# Patient Record
Sex: MALE | Race: WHITE | NOT HISPANIC OR LATINO | URBAN - METROPOLITAN AREA
[De-identification: names, ages, dates, MRNs, and addresses within clinical notes are randomized per-mention and may not be internally consistent; named-entity substitution may affect disease eponyms.]

---

## 2021-09-01 ENCOUNTER — TELEPHONE (OUTPATIENT)
Dept: NEUROSURGERY | Facility: CLINIC | Age: 56
End: 2021-09-01

## 2021-09-01 NOTE — TELEPHONE ENCOUNTER
Caller: BRITTANY GAMEZ    Relationship: Emergency Contact    Best call back number: [phone]    What was the call regarding: PATIENT'S WIFE CALLED TO ASK IF WE TAKE HER HUSBANDS INSURANCE.   SHE IS GOING TO HAVE HER 'S PCP SEND HIM IN A REFERRAL.

## 2021-10-28 ENCOUNTER — OFFICE VISIT (OUTPATIENT)
Dept: NEUROSURGERY | Facility: CLINIC | Age: 56
End: 2021-10-28

## 2021-10-28 ENCOUNTER — HOSPITAL ENCOUNTER (OUTPATIENT)
Dept: GENERAL RADIOLOGY | Facility: HOSPITAL | Age: 56
Discharge: HOME | End: 2021-10-28
Admitting: PHYSICIAN ASSISTANT

## 2021-10-28 VITALS
HEIGHT: 70 IN | OXYGEN SATURATION: 98 % | TEMPERATURE: 97.3 F | HEART RATE: 100 BPM | DIASTOLIC BLOOD PRESSURE: 90 MMHG | WEIGHT: 235 LBS | BODY MASS INDEX: 33.64 KG/M2 | SYSTOLIC BLOOD PRESSURE: 130 MMHG

## 2021-10-28 DIAGNOSIS — M54.12 CERVICAL RADICULOPATHY: Primary | ICD-10-CM

## 2021-10-28 DIAGNOSIS — M54.12 CERVICAL RADICULOPATHY: ICD-10-CM

## 2021-10-28 PROCEDURE — 99204 OFFICE O/P NEW MOD 45 MIN: CPT | Performed by: PHYSICIAN ASSISTANT

## 2021-10-28 PROCEDURE — 72040 X-RAY EXAM NECK SPINE 2-3 VW: CPT

## 2021-10-28 RX ORDER — LISINOPRIL 20 MG/1
20 TABLET ORAL DAILY
COMMUNITY
Start: 2021-08-30

## 2021-10-28 RX ORDER — GABAPENTIN 300 MG/1
300 CAPSULE ORAL 2 TIMES DAILY
COMMUNITY
Start: 2021-09-30 | End: 2022-10-18 | Stop reason: SDUPTHER

## 2021-10-28 NOTE — PROGRESS NOTES
Patient: Jos Miller  : 1965    Primary Care Provider: Kain Gordon MD      Chief Complaint: Neck and right arm pain    History of Present Illness:       Patient is a nice 56-year-old gentleman who is evaluated in neurosurgical practice for neck and right arm pain.  Patient is not naïve to neurosurgery and has had a C5-C7 anterior cervical discectomy and fusion done in [place] when he lived there.  This procedure is done 3 years ago.  Patient did relatively well had intermittent flareups that went away with time but about 4 months ago noticed he had a really bad crick in his neck that started radiating down to the right trapezius into the right deltoid and on down in his arm was more numbness.  Patient is really having a lot of trouble with tipping his head backward.    Patient has given this quite a bit of time but not yet done physical therapy.  Patient is not done injections but has been on gabapentin that is helping about 20%.    This is giving him fits daily and he wishes to try to be treated conservatively which is what we will try.     Patient is here with an MRI from [facility] which does not have any axial imaging without contrast and does not have a T2 axial and some of the images are quite grainy.  This I would consider this is substandard image and I have explained this to the patient.    From my view I think there is a right-sided disc herniation at C4-5 but this would not attribute to all of his symptoms.    Review of Systems   Constitutional: Positive for activity change. Negative for appetite change, chills, diaphoresis, fatigue, fever and unexpected weight change.   HENT: Negative for congestion, dental problem, drooling, ear discharge, ear pain, facial swelling, hearing loss, mouth sores, nosebleeds, postnasal drip, rhinorrhea, sinus pressure, sinus pain, sneezing, sore throat, tinnitus, trouble swallowing and voice change.    Eyes: Negative for photophobia, pain, discharge,  redness, itching and visual disturbance.   Respiratory: Negative for apnea, cough, choking, chest tightness, shortness of breath, wheezing and stridor.    Cardiovascular: Negative for chest pain, palpitations and leg swelling.   Gastrointestinal: Negative for abdominal distention, abdominal pain, anal bleeding, blood in stool, constipation, diarrhea, nausea, rectal pain and vomiting.   Endocrine: Negative for cold intolerance, heat intolerance, polydipsia, polyphagia and polyuria.   Genitourinary: Negative for decreased urine volume, difficulty urinating, dysuria, enuresis, flank pain, frequency, genital sores, hematuria and urgency.   Musculoskeletal: Positive for myalgias, neck pain and neck stiffness. Negative for arthralgias, back pain, gait problem and joint swelling.   Skin: Negative for color change, pallor, rash and wound.   Allergic/Immunologic: Negative for environmental allergies, food allergies and immunocompromised state.   Neurological: Positive for weakness, numbness and headaches. Negative for dizziness, tremors, seizures, syncope, facial asymmetry, speech difficulty and light-headedness.   Hematological: Negative for adenopathy. Does not bruise/bleed easily.   Psychiatric/Behavioral: Negative for agitation, behavioral problems, confusion, decreased concentration, dysphoric mood, hallucinations, self-injury, sleep disturbance and suicidal ideas. The patient is not nervous/anxious and is not hyperactive.    All other systems reviewed and are negative.      Past Medical History:     Past Medical History:   Diagnosis Date   • Back problem    • Headache    • HL (hearing loss)     Right ear only   • Hypertension        Family History:     Family History   Problem Relation Age of Onset   • Other Mother         Polio, whooping cough   • Arthritis Mother    • Atrial fibrillation Mother    • Asthma Mother    • Alzheimer's disease Father    • Liver cancer Maternal Grandmother        Social History:    reports  "that she quit smoking about 13 years ago. She has a 12.00 pack-year smoking history. She has never used smokeless tobacco. She reports previous alcohol use. She reports that she does not use drugs.   SMOKING STATUS: Non-smoker    Surgical History:     Past Surgical History:   Procedure Laterality Date   • APPENDECTOMY  1980   • AXILLARY LYMPH NODE BIOPSY/EXCISION  1997   • CERVICAL LYMPH NODE BIOPSY/EXCISION  1997   • COCHLEAR IMPLANT REVISION  2017   • NECK SURGERY  2017    Dr. Nikhil Barker.        Allergies:   Patient has no known allergies.    Physical Exam:    Vital Signs:/90 (BP Location: Right arm, Patient Position: Sitting, Cuff Size: Adult)   Pulse 100   Temp 97.3 °F (36.3 °C)   Ht 177.8 cm (70\")   Wt 107 kg (235 lb)   SpO2 98%   BMI 33.72 kg/m²    BMI: Body mass index is 33.72 kg/m².     GENERAL:           The patient is in no acute distress, and is able to answer all questions appropriately.    Neck:          Supple without lymphadenopathy    Cardiovascular:       Peripheral pulses 2+ at dorsalis pedis and posterior tibialis    Lungs:         Breathing unlabored    Musculoskeletal:            strength is 5 out of 5 bilaterally.        Shoulder abduction is 5 out of 5.         Dorsiflexion is 5/5 Bilaterally       Plantarflexion is 5/5 bilaterally       Hip Flexion 5/5 bilaterally.         The patient´s gait is normal without antalgia.    Neurologic:          The patient is alert and oriented by 3.          Pupils are equal and reactive to light.         Visual fields are full.         Extraocular movements are intact without nystagmus.         There is no evidence of central motor drift. No facial droop.  No difficulty with rapid alternating movements.         Sensation is equal bilaterally with no deficit.           Reflexes:  2+ through out    CRANIAL NERVES:         Cranial nerve II: Visual fields are full to confrontation.       Cranial nerves III, IV and VI: PERRLA DC.  Extraocular " movements are intact.  Nystagmus is not present.       Cranial nerve V: Facial sensation is intact to light touch.       Cranial nerve VII: Muscles of facial expression revealed no asymmetry.       Cranial nerve VIII: Hearing is intact to finger rub bilaterally.       Cranial nerve IX and X: Palate elevates symmetrically.        Cranial nerve XI: Shoulder shrug is intact.       Cranial nerve XII: Tongue is midline without evidence of Atrophy or fasciculation.    Medical Decision Making    Data Review:   I reviewed the MRI and shows a stable fusion at C5-C7.  No egregious spinal stenosis at the levels above or below.  Patient does seem to have a rightward disc herniation at C4-5 on the axial postcontrast T1 image.  But I cannot delineate this on any of the sagittal imaging    Diagnosis:   Cervical radiculopathy  Status post C5-C7 ACDF in [place] 3 years ago    Treatment Options:   With him having a lot of pain with tipping his neck back, to get a flexion-extension x-ray to ensure that there is no abnormal motion.  His spinous processes at C4-5 seem to be quite splayed I will to make sure there is no posterior ligamentous disruption.    As far as the MRI goes it is not the greatest quality but I do think I see a rightward C4-5 disc herniation on the axial imaging that would attribute to some of his pain in his trapezius and his deltoid.  We will get a foraminal injection with Dr. Jones to see if this gives him some relief.    If he fails to improve with pain management a myelogram and EMG were discussed at this visit and might be his best route considering he has prior hardware in the cervical MRI was less than revealing.    Patient's Body mass index is 33.72 kg/m². indicating that she is obese (BMI >30). Obesity-related health conditions include the following: none. Obesity is newly identified. BMI is is above average; BMI management plan is completed. We discussed portion control and increasing exercise.     Diagnosis  Plan   1. Cervical radiculopathy  XR spine cervical flex and ext only    Ambulatory Referral to Pain Management

## 2021-11-17 ENCOUNTER — TELEPHONE (OUTPATIENT)
Dept: NEUROSURGERY | Facility: CLINIC | Age: 56
End: 2021-11-17

## 2021-11-17 NOTE — TELEPHONE ENCOUNTER
Spoke with pt, very nice gentleman. He had his first consult with Dr. Jones this past Monday, his injection is not scheduled until December 1st.     I advised pt that we should r/s his appt on 11/22/21 until AFTER his injection to see if it helps, he is in full agreement. However, I have changed his appt to a telephone visit to go over XR results. (If Chalo is in agreement)   His # is [phone]

## 2021-11-22 ENCOUNTER — OFFICE VISIT (OUTPATIENT)
Dept: NEUROSURGERY | Facility: CLINIC | Age: 56
End: 2021-11-22

## 2021-11-22 VITALS — HEIGHT: 70 IN | BODY MASS INDEX: 33.64 KG/M2 | WEIGHT: 235 LBS

## 2021-11-22 DIAGNOSIS — M54.12 CERVICAL RADICULOPATHY: Primary | ICD-10-CM

## 2021-11-22 PROCEDURE — 99442 PR PHYS/QHP TELEPHONE EVALUATION 11-20 MIN: CPT | Performed by: PHYSICIAN ASSISTANT

## 2021-12-16 ENCOUNTER — TELEPHONE (OUTPATIENT)
Dept: NEUROSURGERY | Facility: CLINIC | Age: 56
End: 2021-12-16

## 2021-12-16 NOTE — TELEPHONE ENCOUNTER
Provider:  Chalo CORADO  Caller: Hoda  Time of call:   10:38  Phone #:  [phone]  Surgery:    Surgery Date:    Last visit:   11/22/21  Next visit:     NAA:         Reason for call:     Please see Natalie's note.    Okay to schedule follow-up?

## 2021-12-16 NOTE — TELEPHONE ENCOUNTER
Caller: BRITTANY GAMEZ    Relationship to patient: Emergency Contact    Best call back number: [phone] (PATIENT'S #)    Chief complaint: CERVICAL RADICULOPATHY    Type of visit: F/U (OK WITH TELEHEALTH)    Requested date: ASAP     If rescheduling, when is the original appointment: N/A     Additional notes: PATIENT'S SPOUSE IS CALLING TO RELAY THE MESSAGE THAT EPIDURAL INJECTIONS ARE NOT WORKING FOR HIM. SHE WOULD LIKE TO SCHEDULE A FOLLOW UP ASAP; PLEASE ADVISE ON SCHEDULING AND/OR OTHER CARE OPTIONS. THANK YOU.

## 2022-01-04 ENCOUNTER — OFFICE VISIT (OUTPATIENT)
Dept: NEUROSURGERY | Facility: CLINIC | Age: 57
End: 2022-01-04

## 2022-01-04 VITALS — WEIGHT: 235 LBS | BODY MASS INDEX: 33.64 KG/M2 | HEIGHT: 70 IN

## 2022-01-04 DIAGNOSIS — M54.12 CERVICAL RADICULOPATHY: Primary | ICD-10-CM

## 2022-01-04 PROCEDURE — 99442 PR PHYS/QHP TELEPHONE EVALUATION 11-20 MIN: CPT | Performed by: PHYSICIAN ASSISTANT

## 2022-01-04 NOTE — H&P (VIEW-ONLY)
You have chosen to receive care through a telephone visit. Do you consent to use a telephone visit for your medical care today? Yes    15 minutes    Patient: Jos Miller  : 1965    Primary Care Provider: Kain Gordon MD      Chief Complaint: Posterior neck pain    History of Present Illness:        Patient is a nice 56-year-old gentleman who is evaluated in neurosurgical practice for neck and right arm pain.  Patient is not naïve to neurosurgery and has had a C5-C7 anterior cervical discectomy and fusion done in [place] when he lived there.  This procedure is done 3 years ago.  Patient did relatively well had intermittent flareups that went away with time but about 4 months ago noticed he had a really bad crick in his neck that started radiating down to the right trapezius into the right deltoid and on down in his arm was more numbness.  Patient is really having a lot of trouble with tipping his head backward.    At this point patient's numbness after starting the gabapentin has improved with his right upper extremity but he still having a lot of crepitus in his neck when he leans his head forward or backward which causes incredible stiffness and pain that is in between the upper portions of his scapulas.    As discussed in last visit his MRI did not come with axial T2 and T1 axial is very grainy but I do think that there could be a adjacent level disc on the right more so than left at C4-5. This is difficult to delineate with his MRI and I think that it may be best to pursue myelography has discussed with the patient.    Patient really failed with his injections to get much relief out of his neck and his neck stop would be a pain pump. I will get a myelogram with Dr. Monge to check for good fusion status at C5-7 as well as to check the adjacent level at C4-5 for any abnormal motion as well as foraminal stenosis. He really lacks any symptoms into his arm at this point after starting the gabapentin  but does have pain that radiates from the base of his neck into the posterior portion of his trapezius.     Review of Systems   Constitutional: Negative for activity change, appetite change, chills, diaphoresis, fatigue, fever and unexpected weight change.   HENT: Negative for congestion, dental problem, drooling, ear discharge, ear pain, facial swelling, hearing loss, mouth sores, nosebleeds, postnasal drip, rhinorrhea, sinus pressure, sneezing, sore throat, tinnitus, trouble swallowing and voice change.    Eyes: Negative for photophobia, pain, discharge, redness, itching and visual disturbance.   Respiratory: Negative for apnea, cough, choking, chest tightness, shortness of breath, wheezing and stridor.    Cardiovascular: Negative for chest pain, palpitations and leg swelling.   Gastrointestinal: Negative for abdominal distention, abdominal pain, anal bleeding, blood in stool, constipation, diarrhea, nausea, rectal pain and vomiting.   Endocrine: Negative for cold intolerance, heat intolerance, polydipsia, polyphagia and polyuria.   Genitourinary: Negative for decreased urine volume, difficulty urinating, dysuria, enuresis, flank pain, frequency, genital sores, hematuria and urgency.   Musculoskeletal: Positive for myalgias and neck pain. Negative for arthralgias, back pain, gait problem, joint swelling and neck stiffness.   Skin: Negative for color change, pallor, rash and wound.   Allergic/Immunologic: Negative for environmental allergies, food allergies and immunocompromised state.   Neurological: Negative for dizziness, tremors, seizures, syncope, facial asymmetry, speech difficulty, weakness, light-headedness, numbness and headaches.   Hematological: Negative for adenopathy. Does not bruise/bleed easily.   Psychiatric/Behavioral: Negative for agitation, behavioral problems, confusion, decreased concentration, dysphoric mood, hallucinations, self-injury, sleep disturbance and suicidal ideas. The patient is not  "nervous/anxious and is not hyperactive.    All other systems reviewed and are negative.      Past Medical History:     Past Medical History:   Diagnosis Date   • Back problem    • Headache    • HL (hearing loss)     Right ear only   • Hypertension        Family History:     Family History   Problem Relation Age of Onset   • Other Mother         Polio, whooping cough   • Arthritis Mother    • Atrial fibrillation Mother    • Asthma Mother    • Alzheimer's disease Father    • Liver cancer Maternal Grandmother        Social History:    reports that she quit smoking about 14 years ago. She has a 12.00 pack-year smoking history. She has never used smokeless tobacco. She reports previous alcohol use. She reports that she does not use drugs.   SMOKING STATUS: Non-smoker    Surgical History:     Past Surgical History:   Procedure Laterality Date   • APPENDECTOMY  1980   • AXILLARY LYMPH NODE BIOPSY/EXCISION  1997   • CERVICAL LYMPH NODE BIOPSY/EXCISION  1997   • COCHLEAR IMPLANT REVISION  2017   • NECK SURGERY  2017    Dr. Nikhil Barker.        Allergies:   Patient has no known allergies.    Physical Exam:    Vital Signs:Ht 177.8 cm (70\")   Wt 107 kg (235 lb)   BMI 33.72 kg/m²    BMI: Body mass index is 33.72 kg/m².     Exam limited secondary to telephone encounter    Medical Decision Making    Data Review:   No new films reviewed at this visit. Once again MRI was reviewed and axial images that were available were very grainy but it does look like he has an adjacent level disc that is difficult to tell with the image quality.    Diagnosis:   Cervical radiculopathy  Status post C5-C7 ACDF  Possible adjacent level disease    Treatment Options:   Patient has fairly convincing symptoms of a C4-5 adjacent level issue however his MRI that he supplied me with his of poor quality does not have a T2 axial and fails to show me my suspicion. This also Pue with a post myelogram CT which will show us the fusion of the CT 5 to C7 to " make sure there is no pseudoarthrosis. Flexion-extension should also reveal any issues with hardware.    I suspect that he has adjacent level issues which would be easily delineated via myelogram. Risk benefits and expected outcomes were discussed with patient and he understands that Dr. Monge will be doing this in the near future. We will talk with him after the myelogram is done to see if there is anything further surgical intervention that we can contemplate.    Patient's Body mass index is 33.72 kg/m². indicating that she is morbidly obese (BMI > 40 or > 35 with obesity - related health condition). Obesity-related health conditions include the following: none. Obesity is unchanged. BMI is is above average; BMI management plan is completed. We discussed portion control and increasing exercise.    No diagnosis found.

## 2022-01-05 ENCOUNTER — PREP FOR SURGERY (OUTPATIENT)
Dept: OTHER | Facility: HOSPITAL | Age: 57
End: 2022-01-05

## 2022-01-05 DIAGNOSIS — M54.12 CERVICAL RADICULOPATHY: Primary | ICD-10-CM

## 2022-01-19 ENCOUNTER — HOSPITAL ENCOUNTER (OUTPATIENT)
Facility: HOSPITAL | Age: 57
Setting detail: HOSPITAL OUTPATIENT SURGERY
Discharge: HOME | End: 2022-01-19
Attending: RADIOLOGY | Admitting: RADIOLOGY

## 2022-01-19 ENCOUNTER — APPOINTMENT (OUTPATIENT)
Dept: CT IMAGING | Facility: HOSPITAL | Age: 57
End: 2022-01-19

## 2022-01-19 VITALS
BODY MASS INDEX: 34.59 KG/M2 | RESPIRATION RATE: 18 BRPM | HEART RATE: 104 BPM | TEMPERATURE: 97.8 F | HEIGHT: 70 IN | DIASTOLIC BLOOD PRESSURE: 95 MMHG | WEIGHT: 241.62 LBS | SYSTOLIC BLOOD PRESSURE: 140 MMHG | OXYGEN SATURATION: 97 %

## 2022-01-19 DIAGNOSIS — M54.12 CERVICAL RADICULOPATHY: ICD-10-CM

## 2022-01-19 PROCEDURE — 25010000002 IOPAMIDOL 61 % SOLUTION: Performed by: RADIOLOGY

## 2022-01-19 PROCEDURE — 72240 MYELOGRAPHY NECK SPINE: CPT | Performed by: RADIOLOGY

## 2022-01-19 PROCEDURE — 72126 CT NECK SPINE W/DYE: CPT

## 2022-01-19 PROCEDURE — 61055 INJECTION INTO BRAIN CANAL: CPT | Performed by: RADIOLOGY

## 2022-01-19 RX ORDER — LIDOCAINE HYDROCHLORIDE 10 MG/ML
INJECTION, SOLUTION EPIDURAL; INFILTRATION; INTRACAUDAL; PERINEURAL AS NEEDED
Status: DISCONTINUED | OUTPATIENT
Start: 2022-01-19 | End: 2022-01-19 | Stop reason: HOSPADM

## 2022-01-27 ENCOUNTER — OFFICE VISIT (OUTPATIENT)
Dept: NEUROSURGERY | Facility: CLINIC | Age: 57
End: 2022-01-27

## 2022-01-27 DIAGNOSIS — M54.12 CERVICAL RADICULOPATHY: Primary | ICD-10-CM

## 2022-01-27 PROCEDURE — 99442 PR PHYS/QHP TELEPHONE EVALUATION 11-20 MIN: CPT | Performed by: PHYSICIAN ASSISTANT

## 2022-01-27 NOTE — PROGRESS NOTES
You have chosen to receive care through a telephone visit. Do you consent to use a telephone visit for your medical care today? Yes    15 minutes    Patient: Jos Miller  : 1965    Primary Care Provider: Kain Gordon MD      Chief Complaint: Pain in between scapulas crepitus in neck    History of Present Illness:       Patient is a nice 56-year-old gentleman who is evaluated in neurosurgical practice for neck and right arm pain.  Patient is not naïve to neurosurgery and has had a C5-C7 anterior cervical discectomy and fusion done in [place] when he lived there.  This procedure is done 3 years ago.  Patient did relatively well had intermittent flareups that went away with time but about 4 months ago noticed he had a really bad crick in his neck that started radiating down to the right trapezius into the right deltoid and on down in his arm was more numbness.  Patient is really having a lot of trouble with tipping his head backward.     At this point patient's numbness after starting the gabapentin has improved with his right upper extremity but he still having a lot of crepitus in his neck when he leans his head forward or backward which causes incredible stiffness and pain that is in between the upper portions of his scapulas.    Patient underwent myelography with Dr. Mariposa rios not reveal any structural abnormality that would require any surgery.    This was discussed with patient and obviously was little disappointing to him.  I have discussed ongoing options with him as were Dr. Saenz did not exactly examine the patient and recommended pain pump he wishes to see another pain manager provider as a second opinion.  I think spinal stimulation may be of use as well.    Review of Systems   Constitutional: Negative for activity change, appetite change, chills, fatigue and fever.   HENT: Negative for congestion, dental problem, ear pain, hearing loss, sinus pressure and tinnitus.    Eyes: Negative  for pain and redness.   Respiratory: Negative for apnea, cough, shortness of breath and wheezing.    Cardiovascular: Negative for chest pain, palpitations and leg swelling.   Gastrointestinal: Negative for abdominal distention, abdominal pain, blood in stool, constipation, diarrhea, nausea and vomiting.   Endocrine: Negative for cold intolerance, heat intolerance and polyuria.   Genitourinary: Negative for enuresis, frequency and urgency.   Musculoskeletal: Positive for neck pain and neck stiffness.   Skin: Negative for color change and rash.   Neurological: Negative for dizziness, tremors, seizures, syncope, speech difficulty, weakness, light-headedness, numbness and headaches.   Psychiatric/Behavioral: Negative for behavioral problems and confusion. The patient is not nervous/anxious.        Past Medical History:     Past Medical History:   Diagnosis Date   • Back problem    • Cochlear implant in place    • Headache    • HL (hearing loss)     Right ear only   • Hypertension        Family History:     Family History   Problem Relation Age of Onset   • Other Mother         Polio, whooping cough   • Arthritis Mother    • Atrial fibrillation Mother    • Asthma Mother    • Alzheimer's disease Father    • Liver cancer Maternal Grandmother        Social History:    reports that he quit smoking about 14 years ago. He has a 12.00 pack-year smoking history. He has never used smokeless tobacco. He reports previous alcohol use. He reports that he does not use drugs.   SMOKING STATUS: Non-smoker    Surgical History:     Past Surgical History:   Procedure Laterality Date   • APPENDECTOMY  1980   • AXILLARY LYMPH NODE BIOPSY/EXCISION  1997   • CERVICAL LYMPH NODE BIOPSY/EXCISION  1997   • COCHLEAR IMPLANT REVISION  2017   • INTERVENTIONAL RADIOLOGY PROCEDURE N/A 1/19/2022    Procedure: Cervical myelogram;  Surgeon: Chandrakant Monge MD;  Location: [facility] INVASIVE LOC[facility];  Service: Interventional Radiology;  Laterality:  N/A;   • KNEE SURGERY     • NECK SURGERY  2017    Dr. Nikhil Barker.        Allergies:   Patient has no known allergies.    Physical Exam:    Vital Signs:There were no vitals taken for this visit.   BMI: There is no height or weight on file to calculate BMI.     Limited secondary to telephone encounter    Medical Decision Making    Data Review:   Cervical myelogram reviewed showing no signs of foraminal stenosis or adjacent level disease that would require any surgical intervention    Stable arthrosis noted at C5-C7    Diagnosis:   Chronic neck pain    Treatment Options:   Offer the patient a couple times to come in and either see me or Dr. Alvarez discuss further but based off of our conversation it patient wishes to just pursue the second opinion with Dr. Ritter.  I gave him our regards and apologies for transition him to a another telephone visit today.  There is emergency in the operating room and that took Dr. Alvarez away from being able to see him.  Patient does not wish to have a follow-up visit with Dr. Alvarez to discuss further.    However if he ever decides that he would like to see him we can make that arrangement.    Letter for him to Dr. Ritter for second opinion on his cervical pain    Patient's There is no height or weight on file to calculate BMI. indicating that he is obese (BMI >30). Obesity-related health conditions include the following: none. Obesity is unchanged. BMI is is above average; BMI management plan is completed. We discussed portion control and increasing exercise.     Diagnosis Plan   1. Cervical radiculopathy  Ambulatory Referral to Pain Management

## 2022-01-27 NOTE — PATIENT INSTRUCTIONS
Obesity, Adult  Obesity is the condition of having too much total body fat. Being overweight or obese means that your weight is greater than what is considered healthy for your body size. Obesity is determined by a measurement called BMI. BMI is an estimate of body fat and is calculated from height and weight. For adults, a BMI of 30 or higher is considered obese.  Obesity can lead to other health concerns and major illnesses, including:  · Stroke.  · Coronary artery disease (CAD).  · Type 2 diabetes.  · Some types of cancer, including cancers of the colon, breast, uterus, and gallbladder.  · Osteoarthritis.  · High blood pressure (hypertension).  · High cholesterol.  · Sleep apnea.  · Gallbladder stones.  · Infertility problems.  What are the causes?  Common causes of this condition include:  · Eating daily meals that are high in calories, sugar, and fat.  · Being born with genes that may make you more likely to become obese.  · Having a medical condition that causes obesity, including:  ? Hypothyroidism.  ? Polycystic ovarian syndrome (PCOS).  ? Binge-eating disorder.  ? Cushing syndrome.  · Taking certain medicines, such as steroids, antidepressants, and seizure medicines.  · Not being physically active (sedentary lifestyle).  · Not getting enough sleep.  · Drinking high amounts of sugar-sweetened beverages, such as soft drinks.  What increases the risk?  The following factors may make you more likely to develop this condition:  · Having a family history of obesity.  · Being a woman of  descent.  · Being a man of  descent.  · Living in an area with limited access to:  ? [place], recreation centers, or sidewalks.  ? Healthy food choices, such as grocery stores and farmers' markets.  What are the signs or symptoms?  The main sign of this condition is having too much body fat.  How is this diagnosed?  This condition is diagnosed based on:  · Your BMI. If you are an adult with a BMI of 30 or  higher, you are considered obese.  · Your waist circumference. This measures the distance around your waistline.  · Your skinfold thickness. Your health care provider may gently pinch a fold of your skin and measure it.  You may have other tests to check for underlying conditions.  How is this treated?  Treatment for this condition often includes changing your lifestyle. Treatment may include some or all of the following:  · Dietary changes. This may include developing a healthy meal plan.  · Regular physical activity. This may include activity that causes your heart to beat faster (aerobic exercise) and strength training. Work with your health care provider to design an exercise program that works for you.  · Medicine to help you lose weight if you are unable to lose 1 pound a week after 6 weeks of healthy eating and more physical activity.  · Treating conditions that cause the obesity (underlying conditions).  · Surgery. Surgical options may include gastric banding and gastric bypass. Surgery may be done if:  ? Other treatments have not helped to improve your condition.  ? You have a BMI of 40 or higher.  ? You have life-threatening health problems related to obesity.  Follow these instructions at home:  Eating and drinking    · Follow recommendations from your health care provider about what you eat and drink. Your health care provider may advise you to:  ? Limit fast food, sweets, and processed snack foods.  ? Choose low-fat options, such as low-fat milk instead of whole milk.  ? Eat 5 or more servings of fruits or vegetables every day.  ? Eat at home more often. This gives you more control over what you eat.  ? Choose healthy foods when you eat out.  ? Learn to read food labels. This will help you understand how much food is considered 1 serving.  ? Learn what a healthy serving size is.  ? Keep low-fat snacks available.  ? Limit sugary drinks, such as soda, fruit juice, sweetened iced tea, and flavored  milk.  · Drink enough water to keep your urine pale yellow.  · Do not follow a fad diet. Fad diets can be unhealthy and even dangerous.    Physical activity  · Exercise regularly, as told by your health care provider.  ? Most adults should get up to 150 minutes of moderate-intensity exercise every week.  ? Ask your health care provider what types of exercise are safe for you and how often you should exercise.  · Warm up and stretch before being active.  · Cool down and stretch after being active.  · Rest between periods of activity.  Lifestyle  · Work with your health care provider and a dietitian to set a weight-loss goal that is healthy and reasonable for you.  · Limit your screen time.  · Find ways to reward yourself that do not involve food.  · Do not drink alcohol if:  ? Your health care provider tells you not to drink.  ? You are pregnant, may be pregnant, or are planning to become pregnant.  · If you drink alcohol:  ? Limit how much you use to:  § 0-1 drink a day for women.  § 0-2 drinks a day for men.  ? Be aware of how much alcohol is in your drink. In the [place] one drink equals one 12 oz bottle of beer (355 mL), one 5 oz glass of wine (148 mL), or one 1½ oz glass of hard liquor (44 mL).  General instructions  · Keep a weight-loss journal to keep track of the food you eat and how much exercise you get.  · Take over-the-counter and prescription medicines only as told by your health care provider.  · Take vitamins and supplements only as told by your health care provider.  · Consider joining a support group. Your health care provider may be able to recommend a support group.  · Keep all follow-up visits as told by your health care provider. This is important.  Contact a health care provider if:  · You are unable to meet your weight loss goal after 6 weeks of dietary and lifestyle changes.  Get help right away if you are having:  · Trouble breathing.  · Suicidal thoughts or behaviors.  Summary  · Obesity is  the condition of having too much total body fat.  · Being overweight or obese means that your weight is greater than what is considered healthy for your body size.  · Work with your health care provider and a dietitian to set a weight-loss goal that is healthy and reasonable for you.  · Exercise regularly, as told by your health care provider. Ask your health care provider what types of exercise are safe for you and how often you should exercise.  This information is not intended to replace advice given to you by your health care provider. Make sure you discuss any questions you have with your health care provider.  Document Revised: 08/22/2019 Document Reviewed: 08/22/2019  Elsevier Patient Education © 2021 [employer]

## 2022-04-07 ENCOUNTER — OFFICE VISIT (OUTPATIENT)
Dept: PAIN MEDICINE | Facility: CLINIC | Age: 57
End: 2022-04-07

## 2022-04-07 VITALS
SYSTOLIC BLOOD PRESSURE: 138 MMHG | TEMPERATURE: 97.9 F | RESPIRATION RATE: 16 BRPM | DIASTOLIC BLOOD PRESSURE: 90 MMHG | HEIGHT: 70 IN | WEIGHT: 253.2 LBS | HEART RATE: 96 BPM | BODY MASS INDEX: 36.25 KG/M2 | OXYGEN SATURATION: 97 %

## 2022-04-07 DIAGNOSIS — Z00.8 PRE-SURGICAL PSYCHOLOGICAL ASSESSMENT, ENCOUNTER FOR: ICD-10-CM

## 2022-04-07 DIAGNOSIS — M47.812 CERVICAL SPONDYLOSIS WITHOUT MYELOPATHY: ICD-10-CM

## 2022-04-07 DIAGNOSIS — Z96.21 COCHLEAR IMPLANT IN PLACE: ICD-10-CM

## 2022-04-07 DIAGNOSIS — Z98.1 HISTORY OF FUSION OF CERVICAL SPINE: ICD-10-CM

## 2022-04-07 DIAGNOSIS — M50.20 CERVICAL DISC HERNIATION: ICD-10-CM

## 2022-04-07 DIAGNOSIS — M47.812 CERVICAL SPONDYLOSIS WITHOUT MYELOPATHY: Primary | ICD-10-CM

## 2022-04-07 DIAGNOSIS — M96.1 FAILED BACK SYNDROME OF CERVICAL SPINE: ICD-10-CM

## 2022-04-07 PROCEDURE — 99204 OFFICE O/P NEW MOD 45 MIN: CPT | Performed by: ANESTHESIOLOGY

## 2022-04-20 ENCOUNTER — OUTSIDE FACILITY SERVICE (OUTPATIENT)
Dept: PAIN MEDICINE | Facility: CLINIC | Age: 57
End: 2022-04-20

## 2022-04-20 DIAGNOSIS — Z98.1 HISTORY OF FUSION OF CERVICAL SPINE: ICD-10-CM

## 2022-04-20 DIAGNOSIS — M96.1 FAILED BACK SYNDROME OF CERVICAL SPINE: ICD-10-CM

## 2022-04-20 DIAGNOSIS — M96.1 CERVICAL POST-LAMINECTOMY SYNDROME: Primary | ICD-10-CM

## 2022-04-20 DIAGNOSIS — M50.20 CERVICAL DISC HERNIATION: ICD-10-CM

## 2022-04-20 PROCEDURE — 99152 MOD SED SAME PHYS/QHP 5/>YRS: CPT | Performed by: ANESTHESIOLOGY

## 2022-04-20 PROCEDURE — 64491 INJ PARAVERT F JNT C/T 2 LEV: CPT | Performed by: ANESTHESIOLOGY

## 2022-04-20 PROCEDURE — 64490 INJ PARAVERT F JNT C/T 1 LEV: CPT | Performed by: ANESTHESIOLOGY

## 2022-04-21 ENCOUNTER — TELEPHONE (OUTPATIENT)
Dept: PAIN MEDICINE | Facility: CLINIC | Age: 57
End: 2022-04-21

## 2022-04-21 NOTE — TELEPHONE ENCOUNTER
Spoke with pt regarding how they’re feeling after yesterday’s procedure with Dr. Ritter. Pt advised doing well with no complaints. I advised of next procedure date and time. Also advised nothing to eat or drink the night prior, must have a , and the procedure is in the [address]. Pt verbalized understanding, no further needs expressed.

## 2022-04-27 DIAGNOSIS — M96.1 FAILED BACK SYNDROME OF CERVICAL SPINE: Primary | ICD-10-CM

## 2022-05-02 ENCOUNTER — OUTSIDE FACILITY SERVICE (OUTPATIENT)
Dept: PAIN MEDICINE | Facility: CLINIC | Age: 57
End: 2022-05-02

## 2022-05-02 PROCEDURE — 62321 NJX INTERLAMINAR CRV/THRC: CPT | Performed by: ANESTHESIOLOGY

## 2022-05-02 PROCEDURE — 99152 MOD SED SAME PHYS/QHP 5/>YRS: CPT | Performed by: ANESTHESIOLOGY

## 2022-05-03 ENCOUNTER — TELEPHONE (OUTPATIENT)
Dept: PAIN MEDICINE | Facility: CLINIC | Age: 57
End: 2022-05-03

## 2022-05-03 NOTE — TELEPHONE ENCOUNTER
LVM for pt regarding yesterday’s procedure with Dr. Ritter and how they are feeling.  Advised of follow up appointment, and to call the office as needed.

## 2022-08-17 NOTE — PROGRESS NOTES
"Chief Complaint: \"Neck pain and headaches.\"          History of Present Illness:   Patient: Mr. Jos Miller, 57 y.o. male originally referred by Chalo Judd PA-C in consultation for chronic intractable neck pain.  He reports a several year history of neck pain, which began without incident.  He reports a history of ACDF at C5-C7 in [place] about 3 years ago, and thereafter he did well.  About 6 months ago he began experiencing increased neck pain with radiation into his right upper extremity.  He experiences significant stiffness and range of motion difficulties.  He has been seen by neurosurgery, and was found not to be a surgical candidate.  He is also previously been seen by pain management, Dr. Jones when he underwent cervical medial branch blocks without improvement.  Due to his continued mechanical pain, he underwent cervical medial branch blocks performed on April 20, 2022, from which he reported no apparent relief.  Therefore, on May 2, 2022, he underwent a cervical epidural steroid injection, for which she reports no reduction in his symptoms. He did not begin physical therapy thereafter, due to his work schedule.  He returns today for postprocedure follow-up and evaluation.  Pain Description: Constant posterior neck pain with intermittent exacerbation, described as sharp, aching, and burning sensation.   Radiation of Pain: The pain radiates into the LT>RT occipital region causing headaches  Pain intensity today: 7/10  Average pain intensity last week: 6/10  Pain intensity ranges from: 6/10 to 7/10  Aggravating factors: Pain increases with extension, flexion, rotation of the cervical spine   Alleviating factors: Pain decreases with not moving his neck  Associated Symptoms:   Patient denies pain, numbness, weakness in the upper extremities.  He does tell me he has began to experience more tingling in his arms and hands.  Patient denies any new bladder or bowel problems.   Patient reports difficulties with his " "balance but denies recent falls.   Patient reports bilateral cervicogenic and occipital headaches LT>RT lasting several hours.  Stiffness    Review of previous therapies and additional medical records:  Jos Miller has already failed the following measures, including:   Conservative Measures: Oral analgesics, topical analgesics, TENs  Interventional Measures: He saw Dr. Jones on 11/15/2021 when he underwent one set of diagnostic bilateral cervical medial branch blocks at C3, C4, C5 with no apparent relief.   4/20/2022: Cervical MBB's with no relief  5/2/2022: TIN  Surgical Measures:  ACDF C5-C7 in [place] approximately 3 years ago. No history of previous shoulder surgery  [facility] psychological evaluation with Dr. Criss sanders on 5/4/2022, per note:\" From a psychological perspective, patient is considered to be an appropriate candidate for an implantable spinal cord similar device or an intrathecal pain pump device at this time.\"  Jos Miller underwent neurosurgical consultation with Chalo Judd PA-C on 01/27/2022, and was found not to be a surgical candidate.  Jos Miller presents with significant comorbidities including history of cochlear implant and revision in 2017 by Dr. Nikhil Barker, headaches, hypertension  In terms of current analgesics, Jos Miller takes: gabapentin  I have reviewed Kaleb Report is consistent with medication reconciliation.  SOAPP: Low Risk     Global Pain Scale 04-07  2022 08-18  2022         Pain 17 16         Feelings 2 1         Clinical outcomes 3 2         Activities 0 3         GPS Total: 22 22           Review of Diagnostic Studies:    CT myelogram of the cervical spine 1/19/2022: Imaging was reviewed.  Postsurgical change from prior ACDF C5-6 C6-7.  No evidence of hardware complication.  Alignment is preserved.  Multilevel spondylosis.  Axial imaging:  C2-C3: Disc osteophyte complex, facet arthropathy.  Mild right neuroforaminal stenosis  C3-C4: Disc osteophyte complex, facet " hypertrophy.  Mild canal and mild bilateral foraminal stenosis  C4-C5: Disc osteophyte complex, facet arthropathy.  Mild canal and mild bilateral foraminal stenosis  C5-C6, C6-C7: Postoperative changes.  No significant canal or foraminal stenosis  Flexion-extension x-rays of the cervical spine 10/20/2021:  No evidence of instability  MRI of the cervical spine with and without contrast 8/24/2021:  Diffuse cervical spondylosis.    C3-C4: Spurring at endplates, facet hypertrophy.  Mild canal stenosis.  Moderate to severe foraminal stenosis  C4-C5: Moderate spurring of the endplates, facet hypertrophy.  Mild canal stenosis with trace of CSF around the cord.  Moderate left and severe right neuroforaminal stenosis  C5-C6, C6-C7: No significant canal or foraminal stenosis.  Postsurgical changes  No abnormal enhancement    Review of Systems   Musculoskeletal: Positive for neck pain and neck stiffness.   Neurological: Positive for numbness and headaches.   All other systems reviewed and are negative.        Patient Active Problem List   Diagnosis   • Cervical radiculopathy   • Cervical spondylosis without myelopathy   • History of fusion of cervical spine   • Failed back syndrome of cervical spine   • Cervical disc herniation   • Cochlear implant in place       Past Medical History:   Diagnosis Date   • Back problem    • Cochlear implant in place    • Headache    • HL (hearing loss)     Right ear only   • Hypertension          Past Surgical History:   Procedure Laterality Date   • APPENDECTOMY  1980   • AXILLARY LYMPH NODE BIOPSY/EXCISION  1997   • CERVICAL LYMPH NODE BIOPSY/EXCISION  1997   • COCHLEAR IMPLANT REVISION  2017   • INTERVENTIONAL RADIOLOGY PROCEDURE N/A 1/19/2022    Procedure: Cervical myelogram;  Surgeon: Chandrakant Monge MD;  Location: [facility] INVASIVE LOCATION;  Service: Interventional Radiology;  Laterality: N/A;   • KNEE SURGERY     • NECK SURGERY  2017    Dr. Nikhil Barker.          Family History  "  Problem Relation Age of Onset   • Other Mother         Polio, whooping cough   • Arthritis Mother    • Atrial fibrillation Mother    • Asthma Mother    • Alzheimer's disease Father    • Liver cancer Maternal Grandmother          Social History     Socioeconomic History   • Marital status:    Tobacco Use   • Smoking status: Former Smoker     Packs/day: 1.00     Years: 12.00     Pack years: 12.00     Quit date:      Years since quittin.6   • Smokeless tobacco: Never Used   Vaping Use   • Vaping Use: Never used   Substance and Sexual Activity   • Alcohol use: Not Currently   • Drug use: Never   • Sexual activity: Defer           Current Outpatient Medications:   •  Cholecalciferol (vitamin D3) 125 MCG (5000 UT) tablet tablet, Take 125 mcg by mouth Daily., Disp: , Rfl:   •  gabapentin (NEURONTIN) 300 MG capsule, Take 300 mg by mouth 2 (Two) Times a Day., Disp: , Rfl:   •  lisinopril (PRINIVIL,ZESTRIL) 20 MG tablet, Take 20 mg by mouth Daily., Disp: , Rfl:   •  Gel Base gel, 2 g 4 (Four) Times a Day. prilocaine 2%, lidocaine 10%, imipramine 3%, capsaicin 0.001% and mannitol 20%, Disp: 240 g, Rfl: 5      No Known Allergies      /70   Pulse 97   Resp 14   Ht 177.8 cm (70\")   Wt 115 kg (253 lb 0.3 oz)   SpO2 98%   BMI 36.30 kg/m²       Physical Exam:  Constitutional: Patient appears well-developed, well-nourished, well-hydrated  HEENT: Head: Normocephalic and atraumatic  Eyes: Conjunctivae and lids are normal  Pupils: Equal, round, reactive to light  Neck: Trachea normal. Neck supple. No JVD present.   Lymphatic: No cervical adenopathy  Musculoskeletal   Gait and station: Gait evaluation demonstrated a normal gait. Able to walk on heels, toes, tandem walking   Cervical spine: Passive and active range of motion are somewhat limited secondary to pain. Extension and rotation of the cervical spine increased and reproduced pain. Cervical facet joint loading maneuvers are positive.  Muscles: Presence " of active trigger points: None  Shoulders: The range of motion of the glenohumeral joints is full and without pain. Rotator cuff strength is 5/5.   Neurological:   Patient is alert and oriented to person, place, and time.   Speech: Normal.   Cortical function: Normal mental status.   Reflex Scores:  Right brachioradialis: 1+  Left brachioradialis: 1+  Right biceps: 1+  Left biceps: 1+  Right triceps: 1+  Left triceps: 1+  Right patellar: 1+  Left patellar: 1+  Right Achilles: 1+  Left Achilles: 1+  Motor strength: 5/5  Motor Tone: Normal  Involuntary movements: None.   Superficial/Primitive Reflexes: Primitive reflexes were absent.   Right Vaughn: Absent  Left Vaughn: Absent  Right ankle clonus: Absent  Left ankle clonus: Absent   Babinsky: Absent  Spurling sign: Mildly positive. Neck tornado test: Negative. Lhermitte sign: Negative. Long tract signs: Negative.   Sensory exam: Intact to light touch, intact pain and temperature sensation, intact vibration sensation and normal proprioception.   Coordination: Normal finger to nose and heel to shin. Normal balance and negative Romberg's sign   Skin and subcutaneous tissue: Skin is warm and intact. No rash noted. No cyanosis.   Psychiatric: Judgment and insight: Normal. Recent and remote memory: Intact. Mood and affect: Normal.     ASSESSMENT:   1. Failed back syndrome of cervical spine    2. Cervical post-laminectomy syndrome    3. History of fusion of cervical spine    4. Cervical disc herniation    5. Cervical spondylosis without myelopathy        PLAN/MEDICAL DECISION MAKING:  Mr. Jos Miller, 57 y.o. male reports a several year history of neck pain.  He reports a history of ACDF at C5 C7 in [place] about 3 years ago, and thereafter he did well.  About 6 months ago he began experiencing increased neck pain with radiation into his right upper extremity.  He experiences significant stiffness and range of motion difficulties.  He has been seen by neurosurgery, and was  found not to be a surgical candidate.  He has also previously been seen by pain management, Dr. Jones when he underwent cervical medial branch blocks without improvement. Jos Miller underwent follow-up neurosurgical consultation with Chalo Judd PA-C on 01/27/2022, and was found not to be a surgical candidate. MRI of the cervical spine revealed adjacent level disc protrusion at C4-C5 along with facet arthropathy. Patient underwent cervical myelogram that that would not warrant further surgery.  Due to the chronicity of his pain, and failure to obtain relief with several interventional pain management procedures, we have thoroughly discussed elements of a spinal cord stimulator trial and implantation.  He would like to look over some options in regards to device companies he is going to follow-up with me sometime next week and let me know which company he would like to move forward with.  Patient has failed to obtain pain relief with conservative measures including oral analgesics, to name a few.  I have reviewed all available patient's medical records as well as previous therapies as referenced above. I had a lengthy conversation with Mr. Jos Miller regarding his chronic pain condition and potential therapeutic options including risks, benefits, alternative therapies, to name a few. Therefore, I have proposed the following plan:  1. Diagnostic studies: CBC, PTT, PT/INR prior to SCS trial  2. Pharmacological measures: Reviewed and discussed;   A. Patient takes gabapentin 300 mg twice daily  B. Continue prilocaine 2%, lidocaine 10%, imipramine 3%, capsaicin 0.001% and mannitol 20% cream, apply 1 to 2 grams of cream to the affected areas every 4 to 6 hours as needed  3. Interventional pain management measures: Patient will be scheduled for spinal cord stimulator trial with [employer].  Patient has received formal education from me including risks, benefits, and alternative treatments, as well as detailed information  regarding the procedure and specific goals for the trial. Patient has also received didactic materials including educational booklets and DVDs on spinal cord stimulation therapies.  He is already obtained psychological clearance.  4. Long-term rehabilitation efforts:  A. The patient does not have a history of falls. I did complete a risk assessment for falls.  Patient has a history of vertigo  B. Patient has declined participation in a physical therapy program at this time.    C. Start an exercise program such as yoga  D. Contrast therapy: Apply ice-packs for 15-20 minutes, followed by heating pads for 15-20 minutes to affected area   5. The patient and his family have been instructed to contact my office with any questions or difficulties. The patient understands the plan and agrees to proceed accordingly.      The patient has a documented plan of care to address chronic pain.   Jos Miller reports a pain score of 7/10.  Given his pain assessment as noted, treatment options were discussed and the following options were decided upon as a follow-up plan to address the patient's pain: continuation of current treatment plan for pain, educational materials on pain management, home exercises and therapy, referral to Primary Care for assistance in pain treatment guidance, use of non-medical modalities (ice, heat, stretching and/or behavior modifications) and interventional pain management measures and spinal cord stimulation.    I spent 40 minutes face-to-face with the patient and family, of which 35 minutes of the time were spent counseling regarding evaluation, diagnosis, diagnostic testing, treatment options for chronic pain condition and overall rehabilitation, providing links and reading materials applicable to treatment of current condition, risk and benefits of different interventions, alternative therapies, risks and benefits as it relates to spinal cord stimulator devices for trial and implantation and long-term  management and functional goals of spinal cord stimulation    Pain Medications             gabapentin (NEURONTIN) 300 MG capsule Take 300 mg by mouth 2 (Two) Times a Day.           Patient Care Team:  Kain Gordon MD as PCP - General (Family Medicine)  Donnie Ritter MD as Consulting Physician (Pain Medicine)  Vero Myers APRN as Nurse Practitioner (Pain Medicine)     No orders of the defined types were placed in this encounter.        No future appointments.      SHARATH Rivera     Please note that portions of this note were completed with a voice recognition program.

## 2022-08-18 ENCOUNTER — OFFICE VISIT (OUTPATIENT)
Dept: PAIN MEDICINE | Facility: CLINIC | Age: 57
End: 2022-08-18

## 2022-08-18 VITALS
SYSTOLIC BLOOD PRESSURE: 120 MMHG | BODY MASS INDEX: 36.22 KG/M2 | DIASTOLIC BLOOD PRESSURE: 70 MMHG | HEART RATE: 97 BPM | RESPIRATION RATE: 14 BRPM | WEIGHT: 253.02 LBS | OXYGEN SATURATION: 98 % | HEIGHT: 70 IN

## 2022-08-18 DIAGNOSIS — Z01.818 PREOP TESTING: ICD-10-CM

## 2022-08-18 DIAGNOSIS — M96.1 CERVICAL POST-LAMINECTOMY SYNDROME: ICD-10-CM

## 2022-08-18 DIAGNOSIS — M96.1 FAILED BACK SYNDROME OF CERVICAL SPINE: ICD-10-CM

## 2022-08-18 DIAGNOSIS — M50.20 CERVICAL DISC HERNIATION: ICD-10-CM

## 2022-08-18 DIAGNOSIS — Z98.1 HISTORY OF FUSION OF CERVICAL SPINE: ICD-10-CM

## 2022-08-18 DIAGNOSIS — M47.812 CERVICAL SPONDYLOSIS WITHOUT MYELOPATHY: ICD-10-CM

## 2022-08-18 PROCEDURE — 99214 OFFICE O/P EST MOD 30 MIN: CPT | Performed by: NURSE PRACTITIONER

## 2022-09-14 DIAGNOSIS — M96.1 FAILED BACK SYNDROME OF CERVICAL SPINE: Primary | ICD-10-CM

## 2022-10-03 ENCOUNTER — LAB (OUTPATIENT)
Dept: LAB | Facility: HOSPITAL | Age: 57
End: 2022-10-03

## 2022-10-03 LAB
APTT PPP: 30.6 SECONDS (ref 22–39)
BASOPHILS # BLD AUTO: 0.03 10*3/MM3 (ref 0–0.2)
BASOPHILS NFR BLD AUTO: 0.4 % (ref 0–1.5)
DEPRECATED RDW RBC AUTO: 40.6 FL (ref 37–54)
EOSINOPHIL # BLD AUTO: 0.08 10*3/MM3 (ref 0–0.4)
EOSINOPHIL NFR BLD AUTO: 1 % (ref 0.3–6.2)
ERYTHROCYTE [DISTWIDTH] IN BLOOD BY AUTOMATED COUNT: 12.7 % (ref 12.3–15.4)
HCT VFR BLD AUTO: 45.5 % (ref 37.5–51)
HGB BLD-MCNC: 15.5 G/DL (ref 13–17.7)
IMM GRANULOCYTES # BLD AUTO: 0.02 10*3/MM3 (ref 0–0.05)
IMM GRANULOCYTES NFR BLD AUTO: 0.2 % (ref 0–0.5)
INR PPP: 0.88 (ref 0.84–1.13)
LYMPHOCYTES # BLD AUTO: 1.6 10*3/MM3 (ref 0.7–3.1)
LYMPHOCYTES NFR BLD AUTO: 19.6 % (ref 19.6–45.3)
MCH RBC QN AUTO: 29.8 PG (ref 26.6–33)
MCHC RBC AUTO-ENTMCNC: 34.1 G/DL (ref 31.5–35.7)
MCV RBC AUTO: 87.5 FL (ref 79–97)
MONOCYTES # BLD AUTO: 0.49 10*3/MM3 (ref 0.1–0.9)
MONOCYTES NFR BLD AUTO: 6 % (ref 5–12)
NEUTROPHILS NFR BLD AUTO: 5.93 10*3/MM3 (ref 1.7–7)
NEUTROPHILS NFR BLD AUTO: 72.8 % (ref 42.7–76)
NRBC BLD AUTO-RTO: 0 /100 WBC (ref 0–0.2)
PLATELET # BLD AUTO: 267 10*3/MM3 (ref 140–450)
PMV BLD AUTO: 10.2 FL (ref 6–12)
PROTHROMBIN TIME: 11.9 SECONDS (ref 11.4–14.4)
RBC # BLD AUTO: 5.2 10*6/MM3 (ref 4.14–5.8)
WBC NRBC COR # BLD: 8.15 10*3/MM3 (ref 3.4–10.8)

## 2022-10-03 PROCEDURE — 36415 COLL VENOUS BLD VENIPUNCTURE: CPT | Performed by: NURSE PRACTITIONER

## 2022-10-03 PROCEDURE — 85730 THROMBOPLASTIN TIME PARTIAL: CPT | Performed by: NURSE PRACTITIONER

## 2022-10-03 PROCEDURE — 85610 PROTHROMBIN TIME: CPT | Performed by: NURSE PRACTITIONER

## 2022-10-03 PROCEDURE — 85025 COMPLETE CBC W/AUTO DIFF WBC: CPT | Performed by: NURSE PRACTITIONER

## 2022-10-17 ENCOUNTER — OUTSIDE FACILITY SERVICE (OUTPATIENT)
Dept: PAIN MEDICINE | Facility: CLINIC | Age: 57
End: 2022-10-17

## 2022-10-17 PROCEDURE — 63650 IMPLANT NEUROELECTRODES: CPT | Performed by: ANESTHESIOLOGY

## 2022-10-17 PROCEDURE — 99152 MOD SED SAME PHYS/QHP 5/>YRS: CPT | Performed by: ANESTHESIOLOGY

## 2022-10-18 ENCOUNTER — TELEPHONE (OUTPATIENT)
Dept: PAIN MEDICINE | Facility: CLINIC | Age: 57
End: 2022-10-18

## 2022-10-18 DIAGNOSIS — M54.12 CERVICAL RADICULOPATHY: Primary | ICD-10-CM

## 2022-10-18 RX ORDER — GABAPENTIN 300 MG/1
300 CAPSULE ORAL 3 TIMES DAILY
Qty: 90 CAPSULE | Refills: 1 | Status: SHIPPED | OUTPATIENT
Start: 2022-10-18 | End: 2022-12-19

## 2022-10-18 RX ORDER — TIZANIDINE 2 MG/1
TABLET ORAL
Qty: 60 TABLET | Refills: 1 | Status: SHIPPED | OUTPATIENT
Start: 2022-10-18 | End: 2022-11-29 | Stop reason: SDUPTHER

## 2022-10-18 NOTE — TELEPHONE ENCOUNTER
Pt was in office getting reprogrammed with [employer], and he advised that now that he is getting more targeted relief he feels better and is more optimistic. I reminded pt of Thursday lead pull.

## 2022-10-18 NOTE — PROGRESS NOTES
"Chief Complaint: \"I did very well during my stimulator trial.\"      Brief History: Mr. Jos Miller is a 57 y.o. male, who returns to the clinic for possible spinal cord stimulator reprogramming and removal of spinal cord stimulator trial leads placed on 10/17/2022. Mr. Jos Miller reports complete resolution of his headaches and the paresthesia affecting his arms, and 60-70% pain relief of his chronic posterior neck pain.   Jos Miller required of SCS reprogramming during the trial. Patient was able to operate his stimulator device without difficulties.  Patient did not take additional analgesics throughout his spinal cord stimulator trial. He has remained afebrile throughout the trial. Dressings are dry and intact. Patient denies any complications related to the procedure.   Patient is very satisfied with the trial and would like to move forward with implantation of a spinal cord stimulator device.   Pain level is rated as 0/10 (headaches) 4/10 (neck) with the stimulator \"turned on.”   Patient level ranges from 0/10 to 4/10 with the use of the spinal cord stimulator.    Patient reports improvement of his pain, numbness and weakness in the upper extremities.  Patient denies  any new bladder or bowel problems.     Pain History: Jos Miller was originally referred by Chalo Judd PA-C in consultation for chronic intractable neck pain.  He reports a several year history of neck pain, which began without incident. He is status post ACDF at C5-C7 in [place] about 3 years ago, and thereafter he did well. About 8 months ago, he began experiencing increased neck pain with radiation into his right upper extremity.  He experiences significant stiffness and range of motion difficulties.  He has been seen by neurosurgery, and was found not to be a surgical candidate.  He is also previously been seen by pain management, Dr. Jones when he underwent cervical medial branch blocks without improvement.  Due to his continued mechanical pain, " "he underwent cervical medial branch blocks performed on April 20, 2022, from which he reported no apparent relief.  Therefore, on May 2, 2022, he underwent a cervical epidural steroid injection, for which she reports no reduction in his symptoms. He did not begin physical therapy thereafter, due to his work schedule.  He returns today for postprocedure follow-up and evaluation.  Pain Description: Constant posterior neck pain with intermittent exacerbation, described as sharp, aching, and burning sensation.   Radiation of Pain: The pain radiates into the LT>RT occipital region causing headaches  Pain intensity today: 7/10  Average pain intensity last week: 6/10  Pain intensity ranges from: 6/10 to 7/10  Aggravating factors: Pain increases with extension, flexion, rotation of the cervical spine   Alleviating factors: Pain decreases with not moving his neck  Associated Symptoms:   Patient denies pain, numbness, weakness in the upper extremities.  He does tell me he has began to experience more tingling in his arms and hands.  Patient denies any new bladder or bowel problems.   Patient reports difficulties with his balance but denies recent falls.   Patient reports bilateral cervicogenic and occipital headaches LT>RT lasting several hours.  Stiffness     Review of previous therapies and additional medical records:  Jos Miller has already failed the following measures, including:   Conservative Measures: Oral analgesics, topical analgesics, TENs  Interventional Measures:   11/15/2021: one set of diagnostic bilateral cervical medial branch blocks at C3, C4, C5 with Dr. Jones with no relief.   4/20/2022: Cervical MBB's with no relief  5/2/2022: TIN  Surgical Measures:  ACDF C5-C7 in [place] approximately 3 years ago. No history of previous shoulder surgery  P psychological evaluation with Dr. Criss sanders on 5/4/2022, per note:\" From a psychological perspective, patient is considered to be an appropriate candidate for an " "implantable spinal cord similar device or an intrathecal pain pump device at this time.\"  Jos Miller underwent neurosurgical consultation with Chalo Judd PA-C on 01/27/2022, and was found not to be a surgical candidate.  Jos Miller presents with significant comorbidities including history of cochlear implant and revision in 2017 by Dr. Nikhil Barker, headaches, hypertension  In terms of current analgesics, Jos Miller takes: gabapentin  I have reviewed Kaleb Report is consistent with medication reconciliation.  SOAPP: Low Risk     Global Pain Scale 04-07  2022 08-18  2022 10-20  2022             Pain 17 16  10             Feelings 2 1  2             Clinical outcomes 3 2  2             Activities 0 3  0             GPS Total: 22 22 14                  Previous Diagnostic Studies:   CT myelogram of the cervical spine 1/19/2022: Imaging was reviewed.  Postsurgical change from prior ACDF C5-6 C6-7.  No evidence of hardware complication.  Alignment is preserved.  Multilevel spondylosis.  Axial imaging:  C2-C3: Disc osteophyte complex, facet arthropathy.  Mild right neuroforaminal stenosis  C3-C4: Disc osteophyte complex, facet hypertrophy.  Mild canal and mild bilateral foraminal stenosis  C4-C5: Disc osteophyte complex, facet arthropathy.  Mild canal and mild bilateral foraminal stenosis  C5-C6, C6-C7: Postoperative changes.  No significant canal or foraminal stenosis  Flexion-extension x-rays of the cervical spine 10/20/2021:  No evidence of instability  MRI of the cervical spine with and without contrast 8/24/2021:  Diffuse cervical spondylosis.    C3-C4: Spurring at endplates, facet hypertrophy.  Mild canal stenosis.  Moderate to severe foraminal stenosis  C4-C5: Moderate spurring of the endplates, facet hypertrophy.  Mild canal stenosis with trace of CSF around the cord.  Moderate left and severe right neuroforaminal stenosis  C5-C6, C6-C7: No significant canal or foraminal stenosis.  Postsurgical changes  No " "abnormal enhancement    The following portions of the patient's history were reviewed and updated as appropriate: problem list, past medical history, past surgery history, social history, family history, medications, and allergies    Review of Systems   Musculoskeletal: Positive for neck pain.   All other systems reviewed and are negative.      /82   Pulse 96   Temp 97.9 °F (36.6 °C)   Resp 16   Ht 177.8 cm (70\")   Wt 114 kg (252 lb)   SpO2 99%   BMI 36.16 kg/m²       Physical Exam   Constitutional: Patient appears well-developed, well-nourished, well-hydrated  HEENT: Head: Normocephalic and atraumatic  Eyes: Conjunctivae and lids are normal  Pupils: Equal, round, reactive to light  Musculoskeletal   Gait and station: Gait evaluation demonstrated a normal gait.   Neurological:   Patient is alert and oriented to person, place, and time.   Speech: Normal.   Cortical function: Normal mental status.   Motor strength: 5/5  Motor Tone: Normal .   Involuntary movements: None.   Superficial/Primitive Reflexes: Primitive reflexes were absent.   Right Vaughn: Absent  Left Vaughn: Absent  Right ankle clonus: Absent  Left ankle clonus: Absent   Babinsky: Absent  Spurling sign: Negative (w/SCS on). Neck tornado test: Negative. Lhermitte sign: Negative. Long tract signs: Negative.   Sensory exam: Intact to light touch, intact pain and temperature sensation, intact vibration sensation and normal proprioception.   Romberg's sign: Negative   Skin and subcutaneous tissue: Skin is warm and intact. No rash noted. No cyanosis. The stimulator placement site looks unremarkable without erythema, drainage or fluid accumulation  Psychiatric: Judgment and insight: Normal. Recent and remote memory: Intact. Mood and affect: Normal.     PROCEDURES:   Procedure #1: Analysis of the spinal cord stimulator device with complex spinal cord stimulator reprogramming   Patient used the [employer] spinal cord stimulator device 100% of the " time since initiation of the trial phase. The spinal cord stimulator device was reprogrammed under my direct supervision and 2 programs were created by adjusting electrode polarities, amplitude, pulse width, pulse rate, in the following fashion;    Program A1 (Preferred Program):    Electrode polarities: 0-, 1-, 5+, 6+, 7+, 8-, 9-, 13+, 14-, 15+  Amplitude: 1.9 mA     Pulse width: 500 mcs  Rate: 500 Hz  Patient experienced significant pain relief with coverage with pleasant paresthesia in all areas of chronic pain.  Time spent reprogrammin minutes  A copy of the telemetry report will be scanned in the patient's chart.    Procedure #2: Removal of spinal cord stimulator trial leads.   Two leads were removed with tips intact. There is no erythema, drainage, or fluid accumulation at the site. The area was cleansed with chlorhexidine.  A small Covaderm was applied.     ASSESSMENT:   1. Failed back syndrome of cervical spine    2. History of fusion of cervical spine    3. Cervical disc herniation    4. Cervical spondylosis without myelopathy    5. Occipital headache    6. Encounter for fitting and adjustment of neuropacemaker of spinal cord        PLAN/MEDICAL DECISION MAKING: Patient's chronic pain condition has been significantly improved during his spinal cord stimulator trial. Patient is very satisfied with the trial and would like to move forward with implantation of a spinal cord stimulator device. Jos Miller presents with a several year history of chronic intractable neck pain. He has a history of ACDF at C5-C7 in [place] about 3 years ago.  About 8 months ago, he began to experience recurrent neck pain with radiation into his right > left upper extremity and into the occipital region causing unrelenting headaches. He also experiences significant stiffness and restricted range of motion. He has previously been evaluated by Dr. Jones and underwent diagnostic cervical medial branch blocks without improvement. Jos  Paul underwent follow-up neurosurgical consultation with Chalo Judd PA-C on 01/27/2022, and was found not to be a surgical candidate. MRI of the cervical spine revealed adjacent level disc protrusion at C4-C5 along with facet arthropathy without significant stenosis. Also, he underwent cervical myelogram that revealed no indication for further surgery. I have reviewed all available pertinent patient's medical records as well as previous therapies, as referenced above. I had a lengthy conversation with Mr. Jos Miller regarding his chronic pain condition and potential therapeutic options including risks, benefits, alternative therapies, to name a few. Patient's chronic pain condition has been significantly improved during his spinal cord stimulator trial. Patient is very satisfied with the trial and would like to move forward with implantation of a spinal cord stimulator device. Therefore, I have proposed the following plan:  1. Referral back to Dr. Jonathan Alvarez in consultation for implantation of a spinal cord stimulator device. I have recommended a surgical paddle lead [employer] SureScan 2x8 MRI paddle with the top electrodes projecting at the C1 vertebral level. I have recommended [employer] with AdaptiveStim (rechargeable):  Full body MRI compatible up to 1.5 Dipti. Patient will follow-up with SHARATH Mackey.   2. Pharmacological measures: Reviewed and discussed;   A. Patient takes gabapentin 300 mg twice daily  B. Continue prilocaine 2%, lidocaine 10%, imipramine 3%, capsaicin 0.001% and mannitol 20% cream, apply 1 to 2 grams of cream to the affected areas every 4 to 6 hours as needed  3. Long-term rehabilitation efforts:  A. The patient does not have a history of falls. I did complete a risk assessment for falls.  Patient has a history of vertigo  B. Patient has declined participation in a physical therapy program at this time.    C. Start an exercise program such as  w[place]ka YANG Contrast therapy: Apply ice-packs for 15-20 minutes, followed by heating pads for 15-20 minutes to affected area   4. The patient has been instructed to contact my office with any questions or difficulties. The patient understands the plan and agrees to proceed accordingly.      Patient Care Team:  Kain Gordon MD as PCP - General (Family Medicine)  Donnie Ritter MD as Consulting Physician (Pain Medicine)  Vero Myers APRN as Nurse Practitioner (Pain Medicine)     No orders of the defined types were placed in this encounter.        No future appointments.      Donnie Ritter MD      Please note that portions of this note were completed with a voice recognition program.

## 2022-10-20 ENCOUNTER — OFFICE VISIT (OUTPATIENT)
Dept: PAIN MEDICINE | Facility: CLINIC | Age: 57
End: 2022-10-20

## 2022-10-20 VITALS
DIASTOLIC BLOOD PRESSURE: 82 MMHG | RESPIRATION RATE: 16 BRPM | HEIGHT: 70 IN | TEMPERATURE: 97.9 F | OXYGEN SATURATION: 99 % | HEART RATE: 96 BPM | WEIGHT: 252 LBS | SYSTOLIC BLOOD PRESSURE: 122 MMHG | BODY MASS INDEX: 36.08 KG/M2

## 2022-10-20 DIAGNOSIS — M47.812 CERVICAL SPONDYLOSIS WITHOUT MYELOPATHY: ICD-10-CM

## 2022-10-20 DIAGNOSIS — R51.9 OCCIPITAL HEADACHE: ICD-10-CM

## 2022-10-20 DIAGNOSIS — Z98.1 HISTORY OF FUSION OF CERVICAL SPINE: ICD-10-CM

## 2022-10-20 DIAGNOSIS — M96.1 FAILED BACK SYNDROME OF CERVICAL SPINE: ICD-10-CM

## 2022-10-20 DIAGNOSIS — Z46.2 ENCOUNTER FOR FITTING AND ADJUSTMENT OF NEUROPACEMAKER OF SPINAL CORD: ICD-10-CM

## 2022-10-20 DIAGNOSIS — M50.20 CERVICAL DISC HERNIATION: ICD-10-CM

## 2022-10-20 PROCEDURE — 99024 POSTOP FOLLOW-UP VISIT: CPT | Performed by: ANESTHESIOLOGY

## 2022-10-20 PROCEDURE — 95972 ALYS CPLX SP/PN NPGT W/PRGRM: CPT | Performed by: ANESTHESIOLOGY

## 2022-10-21 ENCOUNTER — TELEPHONE (OUTPATIENT)
Dept: NEUROSURGERY | Facility: CLINIC | Age: 57
End: 2022-10-21

## 2022-10-21 NOTE — TELEPHONE ENCOUNTER
----- Message from Pily Lazo RN sent at 10/20/2022 10:09 PM EDT -----  Prior to placing the referral to Dr. Alvarez for surgical paddle placement; will you ask to ensure that he will place this? Cervical C1 Vertebral level.   If not; Dr. Ritter will send to Dr. Mayo. This is not a patient who is a candidate for percutaneous leads due to the location and energy requirements he required during the trial.     Referral back to Dr. Jonathan Alvarez in consultation for implantation of a spinal cord stimulator device. I have recommended a surgical paddle lead [email or web address] SureScan 2x8 MRI paddle with the top electrodes projecting at the C1 vertebral level. I have recommended [employer] with AdaptiveStim (rechargeable):  Full body MRI compatible up to 1.5 Dipti.    Thank you

## 2022-10-22 DIAGNOSIS — M96.1 FAILED BACK SYNDROME OF CERVICAL SPINE: Primary | ICD-10-CM

## 2022-11-02 ENCOUNTER — OFFICE VISIT (OUTPATIENT)
Dept: NEUROSURGERY | Facility: CLINIC | Age: 57
End: 2022-11-02

## 2022-11-02 VITALS
TEMPERATURE: 97.5 F | DIASTOLIC BLOOD PRESSURE: 98 MMHG | WEIGHT: 253.2 LBS | BODY MASS INDEX: 36.25 KG/M2 | HEIGHT: 70 IN | RESPIRATION RATE: 16 BRPM | SYSTOLIC BLOOD PRESSURE: 140 MMHG

## 2022-11-02 DIAGNOSIS — M54.12 CERVICAL RADICULOPATHY: Primary | ICD-10-CM

## 2022-11-02 DIAGNOSIS — G44.209 TENSION HEADACHE: ICD-10-CM

## 2022-11-02 PROBLEM — H91.90 HEARING LOSS: Status: ACTIVE | Noted: 2018-01-01

## 2022-11-02 PROBLEM — I10 HYPERTENSION: Status: ACTIVE | Noted: 2018-03-26

## 2022-11-02 PROBLEM — F11.20 NARCOTIC HABITUATION, CONTINUOUS: Status: ACTIVE | Noted: 2018-07-16

## 2022-11-02 PROCEDURE — 99214 OFFICE O/P EST MOD 30 MIN: CPT | Performed by: NEUROLOGICAL SURGERY

## 2022-11-02 NOTE — PATIENT INSTRUCTIONS
Before your next appointment with Dr. Alvarez please complete the followin.) Appt with a Neurologist  2.) Nerve study of your arms  3.) Xray of your neck     Dr. Alvarez will see you back in office once ALL the above is complete. If you have any questions/concerns please call [facility].  [phone]

## 2022-11-02 NOTE — PROGRESS NOTES
NAME: ASTON GAMEZ   DOS: 2022  : 1965  PCP: Kain Gordon MD    Chief Complaint:    Chief Complaint   Patient presents with   • Neck/shoulder pain   • Headache       History of Present Illness:  57 y.o. male   As well as 57-year-old neurosurgical consultation he has a complex history of for cervical disc disease and was treated back in 2018 with a two-level anterior cervical discectomy and fusion while he was in [place].  He continues to work he has been seen and evaluated by my PA and underwent a further work-up for cervical disc disease he has a multiplicity of symptoms including hand numbness base of skull pain as well as retro-orbital headache and migraines.  He has never been seen by a neurologist and has undergone injections in his cervical spine to no avail.  He was sent after a trial for a spinal stimulator that produced significant reduction in his headaches per his report however denied clear-cut changes in his hands after the but just felt better overall he was in a collar however that produced a lot of neck pain he is here for evaluation to consider spinal stimulation versus ongoing surgical care he had a cochlear implant    PMHX  Allergies:  No Known Allergies  Medications    Current Outpatient Medications:   •  Cholecalciferol (vitamin D3) 125 MCG (5000 UT) tablet tablet, Take 125 mcg by mouth Daily., Disp: , Rfl:   •  gabapentin (NEURONTIN) 300 MG capsule, Take 1 capsule by mouth 3 (Three) Times a Day., Disp: 90 capsule, Rfl: 1  •  Gel Base gel, 2 g 4 (Four) Times a Day. prilocaine 2%, lidocaine 10%, imipramine 3%, capsaicin 0.001% and mannitol 20%, Disp: 240 g, Rfl: 5  •  lisinopril (PRINIVIL,ZESTRIL) 20 MG tablet, Take 20 mg by mouth Daily., Disp: , Rfl:   •  tiZANidine (ZANAFLEX) 2 MG tablet, 1/2 to 1 tab TID PRN muscle spasm, Disp: 60 tablet, Rfl: 1  Past Medical History:  Past Medical History:   Diagnosis Date   • Back problem    • Cervical disc disorder     Surgery     • Cochlear implant in place    • Headache    • HL (hearing loss)     Right ear only   • Hypertension    • Migraine     Wake up with them frequently   • Narcotic habituation, continuous (HCC) 2018   • Neck pain    • Spinal stenosis      Past Surgical History:  Past Surgical History:   Procedure Laterality Date   • APPENDECTOMY     • AXILLARY LYMPH NODE BIOPSY/EXCISION     • CERVICAL LYMPH NODE BIOPSY/EXCISION     • COCHLEAR IMPLANT REVISION     • EPIDURAL BLOCK     • INTERVENTIONAL RADIOLOGY PROCEDURE N/A 2022    Procedure: Cervical myelogram;  Surgeon: Chandrakant Monge MD;  [facility]: [facility] INVASIVE LOC[facility];  Service: Interventional Radiology;  Laterality: N/A;   • KNEE SURGERY     • NECK SURGERY  2017    C5-7 ACDF- Dr. Nikhil Barker.     Social Hx:  Social History     Tobacco Use   • Smoking status: Former     Packs/day: 1.00     Years: 12.00     Pack years: 12.00     Types: Cigarettes     Quit date: 2008     Years since quittin.8   • Smokeless tobacco: Never   Vaping Use   • Vaping Use: Never used   Substance Use Topics   • Alcohol use: Not Currently   • Drug use: Never     Family Hx:  Family History   Problem Relation Age of Onset   • Other Mother         Polio, whooping cough   • Arthritis Mother    • Atrial fibrillation Mother    • Asthma Mother    • Alzheimer's disease Father    • Liver cancer Maternal Grandmother      Review of Systems:        Review of Systems   Constitutional: Negative for activity change, appetite change, chills, diaphoresis, fatigue, fever and unexpected weight change.   HENT: Negative for congestion, dental problem, drooling, ear discharge, ear pain, facial swelling, hearing loss, mouth sores, nosebleeds, postnasal drip, rhinorrhea, sinus pressure, sneezing, sore throat, tinnitus, trouble swallowing and voice change.    Eyes: Negative for photophobia, pain, discharge, redness, itching and visual disturbance.   Respiratory: Negative  for apnea, cough, choking, chest tightness, shortness of breath, wheezing and stridor.    Cardiovascular: Negative for chest pain, palpitations and leg swelling.   Gastrointestinal: Negative for abdominal distention, abdominal pain, anal bleeding, blood in stool, constipation, diarrhea, nausea, rectal pain and vomiting.   Endocrine: Negative for cold intolerance, heat intolerance, polydipsia, polyphagia and polyuria.   Genitourinary: Negative for decreased urine volume, difficulty urinating, dysuria, enuresis, flank pain, frequency, genital sores, hematuria and urgency.   Musculoskeletal: Positive for arthralgias and neck pain. Negative for back pain, gait problem, joint swelling, myalgias and neck stiffness.   Skin: Negative for color change, pallor, rash and wound.   Allergic/Immunologic: Negative for environmental allergies, food allergies and immunocompromised state.   Neurological: Negative for dizziness, tremors, seizures, syncope, facial asymmetry, speech difficulty, weakness, light-headedness, numbness and headaches.   Hematological: Negative for adenopathy. Does not bruise/bleed easily.   Psychiatric/Behavioral: Negative for agitation, behavioral problems, confusion, decreased concentration, dysphoric mood, hallucinations, self-injury, sleep disturbance and suicidal ideas. The patient is not nervous/anxious and is not hyperactive.    All other systems reviewed and are negative.     I have reviewed this note template and all pertinent parts of the review of systems social, family history, surgical history and medication list      Physical Examination:  Vitals:    11/02/22 1147   BP: 140/98   Resp: 16   Temp: 97.5 °F (36.4 °C)      General Appearance:   Well developed, well nourished, well groomed, alert, and cooperative.  Neurological examination:  Neurologic Exam  Vital signs were reviewed and documented in the chart  Patient appeared in good neurologic function with normal comprehension fluent speech  Mood  and affect are normal  Sense of smell deferred  COVID mass left in place  Muscle bulk and tone normal  5 out of 5 strength no motor drift  Gait normal intact  Negative Romberg  No clonus long tract signs or myelopathy  Neck is supple  Reflexes symmetric he may be a tad hyperreflexic  No edema noted and extremities skin appears normal  He is awake and Tinel's on the left wrist  Straight leg raise sign absent  No signs of intrinsic hip dysfunction  Back is without any lesions or abnormality  Feet are warm and well perfused        Review of Imaging/DATA:  I personally reviewed his MRIs of the cervical spine dated from a year ago he has intraforaminal disc disease at the adjacent level C4-5 on the right and he has some hypermobility at the C3-4 with what I would classify as moderate central canal stenosis he also has a bone island at the C7 vertebral prominence is stable and present it may be a small hemangioma or some prior osteoma is been present since his older MRIs and is present densely calcified on his CT scan  Diagnoses/Plan:    Mr. Miller is a 57 y.o. male   1.  Complex regional neck pain  2.  Suspect carpal tunnel bilaterally  3.  Migraines suboccipital headache    As far as his neck pain its complicated he did get some relief but he stated it was more from his migraines from his stimulator I worry about his moderate degrees of central stenosis and having a paddle stimulator under the cervical posterior area with his adjacent level hypermobility at the 3 4 area could pose some risk for for example migration I also suspect that is not enough help he has numbness in his hands I suspect is from his carpal tunnel    I explained the complex nature of operating on a revision neck for mainly axial neck pain for example revision 3445 surgery may be a reasonable option or posterior cervical multilevel is can be associated with limitation of range of motion in the neck and it may not help him but I suspect he probably  given the acuity of the onset of his pain it occurred quite abruptly it seems like it could be related to the 3445 disease    From my standpoint I recommended    1.  EMG nerve conduction study upper extremities to look for carpal tunnel this may explain his hands being numb it will also allow me to confirm adjacent level radiculopathy    2.  I like a neurology visit just for this retro-orbital left eye pain and suboccipital headache that radiates up in a C2 distribution    3.  Cervical flexion-extension film    I will see him back after this I explained that these are potentially different buckets.  I explained for example that is numb hands may or may not have anything to do with his neck additionally of his EMG nerve conduction study shows no carpal tunnel it may be that his hypermobility is leading to a myelopathic picture    I feel that cervical spinal stimulation is a reasonable option as a last option but it certainly not the first option I also explained that the complication rate may be higher than standard given the anatomy of his neck

## 2022-11-17 ENCOUNTER — OFFICE VISIT (OUTPATIENT)
Dept: NEUROLOGY | Facility: CLINIC | Age: 57
End: 2022-11-17

## 2022-11-17 VITALS
DIASTOLIC BLOOD PRESSURE: 72 MMHG | BODY MASS INDEX: 36.45 KG/M2 | SYSTOLIC BLOOD PRESSURE: 138 MMHG | HEART RATE: 107 BPM | WEIGHT: 254 LBS | OXYGEN SATURATION: 98 %

## 2022-11-17 DIAGNOSIS — G47.33 OBSTRUCTIVE SLEEP APNEA: ICD-10-CM

## 2022-11-17 DIAGNOSIS — G43.719 INTRACTABLE CHRONIC MIGRAINE WITHOUT AURA AND WITHOUT STATUS MIGRAINOSUS: Primary | ICD-10-CM

## 2022-11-17 DIAGNOSIS — R20.2 PARESTHESIAS: ICD-10-CM

## 2022-11-17 PROCEDURE — 99205 OFFICE O/P NEW HI 60 MIN: CPT | Performed by: PSYCHIATRY & NEUROLOGY

## 2022-11-17 RX ORDER — ELETRIPTAN HYDROBROMIDE 20 MG/1
20 TABLET, FILM COATED ORAL AS NEEDED
Qty: 8 TABLET | Refills: 3 | Status: SHIPPED | OUTPATIENT
Start: 2022-11-17 | End: 2023-11-17

## 2022-11-17 RX ORDER — TOPIRAMATE 25 MG/1
TABLET ORAL
Qty: 42 TABLET | Refills: 0 | Status: SHIPPED | OUTPATIENT
Start: 2022-11-17 | End: 2023-02-09

## 2022-11-17 RX ORDER — TOPIRAMATE 50 MG/1
50 TABLET, FILM COATED ORAL 2 TIMES DAILY
Qty: 60 TABLET | Refills: 5 | Status: SHIPPED | OUTPATIENT
Start: 2022-11-17 | End: 2023-01-17 | Stop reason: SDUPTHER

## 2022-11-17 NOTE — PROGRESS NOTES
Subjective:    CC: Jos Miller is seen today in consultation at the request of Jonathan Alvarez MD for Neck Pain, Migraine, and Numbness       HPI:  57 year old male with a h/o chronic neck pain s/p cervical fusion surgery at C5-6-7, HTN, hearing loss in right ear s/p cochlear implant p/w headaches. As per patient he has had headaches for several years that start of in the back of his neck/head mainly on the left radiating to the front to behind his eye with occasional nausea, photophobia and phonophobia.  The headaches reach 8/10 in severity.  He has tried over-the-counter medications such as NSAIDs that do not help much.  He has also tried his wife's Relpax that helps within an hour.  He has also had problems with chronic neck pain with tingling and numbness in his arms/hands that has been ongoing for a few years.  He had neck fusion surgery at C5-6-7 in May 2018 that helped relieve his symptoms for some time but they have returned now.  He saw neurosurgery last year and they ordered a MRI of the cervical spine for him that showed multilevel disc bulges/degenerative disc disease with right more than left neural foraminal stenosis at C3-4 and C4-5 as well as post fusion changes in the lower cervical levels.  After that he saw pain management and they gave him epidural nerve blocks that did not help.  He also had a trial for a spinal cord stimulator that helped tremendously with his headaches/some of his neck pain but not with the paresthesias in his hands.  He recently saw Dr. Alvarez again and he felt that a spinal cord stimulator will not help him long-term.  He has ordered an EMG/NCS for him to rule out compression neuropathies.  He also has hearing loss in his right ear that occurred after his previous neck surgery for which he has a cochlear implant.  Denies any tinnitus/hearing changes with his headaches that occur about 3-5 times a week.  He also reports to snoring at night and does have daytime  somnolence/fatigue.  Currently takes gabapentin 300 mg 3 times a day for neck pain that was started last year as well as tizanidine as needed that was started recently.  Of note- I personally reviewed his MRI cervical spine, Dr. Alvarez's note and Dr. Ritter's note    The following portions of the patient's history were reviewed today and updated as of 11/17/2022  : allergies, current medications, past family history, past medical history, past surgical history and problem list  These document will be scanned to patient's chart.      Current Outpatient Medications:   •  Cholecalciferol (vitamin D3) 125 MCG (5000 UT) tablet tablet, Take 125 mcg by mouth Daily., Disp: , Rfl:   •  gabapentin (NEURONTIN) 300 MG capsule, Take 1 capsule by mouth 3 (Three) Times a Day., Disp: 90 capsule, Rfl: 1  •  Gel Base gel, 2 g 4 (Four) Times a Day. prilocaine 2%, lidocaine 10%, imipramine 3%, capsaicin 0.001% and mannitol 20%, Disp: 240 g, Rfl: 5  •  lisinopril (PRINIVIL,ZESTRIL) 20 MG tablet, Take 20 mg by mouth Daily., Disp: , Rfl:   •  tiZANidine (ZANAFLEX) 2 MG tablet, 1/2 to 1 tab TID PRN muscle spasm, Disp: 60 tablet, Rfl: 1  •  eletriptan (RELPAX) 20 MG tablet, Take 1 tablet by mouth As Needed for Migraine (Do not take over 2 tabs a day or 8 tabs a month)., Disp: 8 tablet, Rfl: 3  •  topiramate (Topamax) 25 MG tablet, Take 1 tablet at night for 1 week then one tablet twice a day for 1 week then 1 tablet in the morning and 2 tablets at night for 1 week, Disp: 42 tablet, Rfl: 0  •  topiramate (Topamax) 50 MG tablet, Take 1 tablet by mouth 2 (Two) Times a Day., Disp: 60 tablet, Rfl: 5   Past Medical History:   Diagnosis Date   • Back problem    • Cervical disc disorder     Surgery 2016   • Cochlear implant in place    • Headache    • HL (hearing loss)     Right ear only   • Hypertension    • Migraine     Wake up with them frequently   • Narcotic habituation, continuous (HCC) 07/16/2018   • Neck pain    • Spinal stenosis 2015       Past Surgical History:   Procedure Laterality Date   • APPENDECTOMY     • AXILLARY LYMPH NODE BIOPSY/EXCISION     • CERVICAL LYMPH NODE BIOPSY/EXCISION     • COCHLEAR IMPLANT REVISION     • EPIDURAL BLOCK     • INTERVENTIONAL RADIOLOGY PROCEDURE N/A 2022    Procedure: Cervical myelogram;  Surgeon: Chandrakant Monge MD;  Location: [facility] INVASIVE LOCATION;  Service: Interventional Radiology;  Laterality: N/A;   • KNEE SURGERY     • NECK SURGERY  2017    C5-7 ACDF- Dr. Nikhil Barker.      Family History   Problem Relation Age of Onset   • Other Mother         Polio, whooping cough   • Arthritis Mother    • Atrial fibrillation Mother    • Asthma Mother    • Alzheimer's disease Father    • Liver cancer Maternal Grandmother       Social History     Socioeconomic History   • Marital status:    Tobacco Use   • Smoking status: Former     Packs/day: 1.00     Years: 12.00     Pack years: 12.00     Types: Cigarettes     Quit date: 2008     Years since quittin.8   • Smokeless tobacco: Never   Vaping Use   • Vaping Use: Never used   Substance and Sexual Activity   • Alcohol use: Not Currently   • Drug use: Never   • Sexual activity: Yes     Partners: Female     Birth control/protection: None     Review of Systems   Musculoskeletal: Positive for neck pain and neck stiffness.   Neurological: Positive for numbness and headache.   All other systems reviewed and are negative.      Objective:    /72   Pulse 107   Wt 115 kg (254 lb)   SpO2 98%   BMI 36.45 kg/m²     Neurology Exam:    General apperance: Obese, no tenderness to palpation of his greater or lesser occipital regions on both sides.  Reduced neck mobility in all directions    Mental status: Alert, awake and oriented to time place and person.    Recent and Remote memory: Intact.    Attention span and Concentration: Normal.     Language and Speech: Intact- No dysarthria.    Fluency, Naming , Repitition and  Comprehension:  Intact    Cranial Nerves:   CN II: Visual fields are full. Intact. Fundi - Normal, No papillederma, Pupils - AVANI  CN III, IV and VI: Extraocular movements are intact. Normal saccades.   CN V: Facial sensation is intact.   CN VII: Muscles of facial expression reveal no asymmetry. Intact.   CN VIII: Hearing is intact. Whispered voice intact.   CN IX and X: Palate elevates symmetrically. Intact  CN XI: Shoulder shrug is intact.   CN XII: Tongue is midline without evidence of atrophy or fasciculation.     Ophthalmoscopic exam of optic disc-normal    Motor:  Right UE muscle strength 5/5. Normal tone.     Left UE muscle strength 5/5. Normal tone.      Right LE muscle strength5/5. Normal tone.     Left LE muscle strength 5/5. Normal tone.      Sensory: Normal light touch, vibration and pinprick sensation bilaterally.    DTRs: 2+ bilaterally in upper and lower extremities.    Babinski: Negative bilaterally.    Co-ordination: Normal finger-to-nose, heel to shin B/L.    Rhomberg: Negative.    Gait: Normal.    Cardiovascular: Regular rate and rhythm without murmur, gallop or rub.    Assessment and Plan:  1. Intractable chronic migraine without aura and without status migrainosus  He could have cervico-occipital neuralgia/migraine headaches  Since he does not have occipital tenderness I will defer giving him an occipital nerve block at this time  I will first start him on Topamax for headache prophylaxis gradually increasing to 50 mg twice a day  For abortive treatment of his headaches he can continue Relpax 20 mg as needed  If Topamax does not help I may proceed with occipital nerve block/trigger point injections    2.  Obstructive sleep apnea  Based on his symptoms I feel he does have sleep apnea.  I will give him a sleep medicine referral    3.  Paresthesias  He has neck pain/bilateral arm/hand paresthesias  MRI cervical spine did show multilevel disc bulges/degenerative disc changes with right more than  left neural foraminal stenosis at C3-4-5   He also has an EMG/NCS pending  Already on gabapentin 300 mg 3 times a day.         Return in about 6 weeks (around 12/29/2022).     I spent over 55 minutes with the patient face to face out of which over 50% (30 minutes) was spent in management, instructions and education.     Nikki Salter MD

## 2022-11-18 ENCOUNTER — TELEPHONE (OUTPATIENT)
Dept: NEUROSURGERY | Facility: CLINIC | Age: 57
End: 2022-11-18

## 2022-11-18 NOTE — TELEPHONE ENCOUNTER
Caller: Jos Miller    Relationship: Self    Best call back number: [phone]    What is the best time to reach you:ANYTIME    Who are you requesting to speak with (clinical staff, provider,  specific staff member):CLINICAL STAFF    Do you know the name of the person who called:NA    What was the call regarding:PTS WIFE CALLED AND STATES THAT THEY MET WITH THE NEUROLOGIST-BUT THEY HAVE NOT HEARD ANYTHING ABOUT SCHEDULING A EMG TEST-WOULD LIKE A CALL BACK TO ADVISE WHO THEY NEED TO CONTACT TO SCHEDULE THANK YOU!    Do you require a callback: YES

## 2022-11-22 ENCOUNTER — TELEPHONE (OUTPATIENT)
Dept: PAIN MEDICINE | Facility: CLINIC | Age: 57
End: 2022-11-22

## 2022-11-22 DIAGNOSIS — Z98.1 HISTORY OF FUSION OF CERVICAL SPINE: Primary | ICD-10-CM

## 2022-11-22 NOTE — TELEPHONE ENCOUNTER
I spoke with Lennie, pt's spouse. It looks like the EMG is not scheduled until February.     Shari/Mariam Can we get this scheduled with Dr. Reilly in [place] (Pt is in agreement to travel) He would probably be able to do the study sooner.     Please let me know.

## 2022-11-22 NOTE — TELEPHONE ENCOUNTER
Caller: FRANCO BRITTANY    Relationship: Emergency Contact    Best call back number:     What is the best time to reach you: ANY    Who are you requesting to speak with (clinical staff, provider,  specific staff member): CLINICAL DR MOMIN IF POSSIBLE     What was the call regarding: PATIENT AND SPOUSE ARE FRUSTRATED.  THEY DID A SPINAL STIMULATOR TRIAL THAT SEEMED TO HELP THEM BUT THEN PATIENT WAS REFERRED BACK TO NEUROSURGEON WHO ADVISED HE COULD NOT DO THE SURGERY NEEDED WITH A PADDLE AND HE INSTEAD SIGNED THEM UP FOR A SLEEP STUDY AND CARPAL TUNNEL REVIEW.  THEY DONT AGREE WITH WHATS BEING SAID AND WOULD LIKE FOR DR MOMIN TO REVIEW THE NOTES WITH PATIENT AND SEE WHAT HE THINKS    Do you require a callback: YES

## 2022-11-29 RX ORDER — TIZANIDINE 2 MG/1
TABLET ORAL
Qty: 60 TABLET | OUTPATIENT
Start: 2022-11-29

## 2022-11-29 RX ORDER — TIZANIDINE 2 MG/1
TABLET ORAL
Qty: 180 TABLET | Refills: 1 | Status: SHIPPED | OUTPATIENT
Start: 2022-11-29

## 2022-12-01 RX ORDER — TIZANIDINE 2 MG/1
2 TABLET ORAL EVERY 8 HOURS PRN
Qty: 30 TABLET | Refills: 0 | Status: SHIPPED | OUTPATIENT
Start: 2022-12-01

## 2022-12-15 ENCOUNTER — HOSPITAL ENCOUNTER (OUTPATIENT)
Dept: NEUROLOGY | Facility: HOSPITAL | Age: 57
Discharge: HOME | End: 2022-12-15

## 2022-12-15 ENCOUNTER — HOSPITAL ENCOUNTER (OUTPATIENT)
Dept: GENERAL RADIOLOGY | Facility: HOSPITAL | Age: 57
Discharge: HOME | End: 2022-12-15

## 2022-12-15 ENCOUNTER — OFFICE VISIT (OUTPATIENT)
Dept: NEUROSURGERY | Facility: CLINIC | Age: 57
End: 2022-12-15

## 2022-12-15 VITALS
WEIGHT: 257 LBS | TEMPERATURE: 97.8 F | DIASTOLIC BLOOD PRESSURE: 78 MMHG | HEIGHT: 70 IN | SYSTOLIC BLOOD PRESSURE: 128 MMHG | BODY MASS INDEX: 36.79 KG/M2

## 2022-12-15 DIAGNOSIS — M54.12 CERVICAL RADICULOPATHY: ICD-10-CM

## 2022-12-15 DIAGNOSIS — M54.2 NECK PAIN: Primary | ICD-10-CM

## 2022-12-15 DIAGNOSIS — G56.01 CARPAL TUNNEL SYNDROME ON RIGHT: ICD-10-CM

## 2022-12-15 DIAGNOSIS — Z98.1 HISTORY OF FUSION OF CERVICAL SPINE: ICD-10-CM

## 2022-12-15 PROCEDURE — 99214 OFFICE O/P EST MOD 30 MIN: CPT | Performed by: NEUROLOGICAL SURGERY

## 2022-12-15 PROCEDURE — 95886 MUSC TEST DONE W/N TEST COMP: CPT

## 2022-12-15 PROCEDURE — 72040 X-RAY EXAM NECK SPINE 2-3 VW: CPT

## 2022-12-15 PROCEDURE — 95910 NRV CNDJ TEST 7-8 STUDIES: CPT

## 2022-12-15 NOTE — PROGRESS NOTES
NAME: ASTON GAMEZ   DOS: 12/15/2022  : 1965  PCP: Kain Gordon MD    Chief Complaint:    Chief Complaint   Patient presents with   • Neck & left shoulder pain       History of Present Illness:  57 y.o. male   I saw this 57-year-old male in neurosurgical follow-up he is well-known to me presented with a history of 3 separate symptoms including right hand numbness    Left-sided scapular pain    Previous history of two-level ACDF by another surgeon    And suboccipital headache and retro-orbital eye pain    He has been diagnosed as we suspected of the migraines, carpal tunnel and is currently on Topamax it helps    His wife accompanied him he is here for evaluation    PMHX  Allergies:  No Known Allergies  Medications    Current Outpatient Medications:   •  Cholecalciferol (vitamin D3) 125 MCG (5000 UT) tablet tablet, Take 125 mcg by mouth Daily., Disp: , Rfl:   •  eletriptan (RELPAX) 20 MG tablet, Take 1 tablet by mouth As Needed for Migraine (Do not take over 2 tabs a day or 8 tabs a month)., Disp: 8 tablet, Rfl: 3  •  gabapentin (NEURONTIN) 300 MG capsule, Take 1 capsule by mouth 3 (Three) Times a Day., Disp: 90 capsule, Rfl: 1  •  Gel Base gel, 2 g 4 (Four) Times a Day. prilocaine 2%, lidocaine 10%, imipramine 3%, capsaicin 0.001% and mannitol 20%, Disp: 240 g, Rfl: 5  •  lisinopril (PRINIVIL,ZESTRIL) 20 MG tablet, Take 20 mg by mouth Daily., Disp: , Rfl:   •  tiZANidine (ZANAFLEX) 2 MG tablet, 1/2 to 1 tab TID PRN muscle spasm, Disp: 180 tablet, Rfl: 1  •  tiZANidine (ZANAFLEX) 2 MG tablet, Take 1 tablet by mouth Every 8 (Eight) Hours As Needed for Muscle Spasms. Start 1/2 tab QHS, then continue 1/2 tab to 1 tab TID PRN muscle spasms, Disp: 30 tablet, Rfl: 0  •  topiramate (Topamax) 25 MG tablet, Take 1 tablet at night for 1 week then one tablet twice a day for 1 week then 1 tablet in the morning and 2 tablets at night for 1 week, Disp: 42 tablet, Rfl: 0  •  topiramate (Topamax) 50 MG  tablet, Take 1 tablet by mouth 2 (Two) Times a Day., Disp: 60 tablet, Rfl: 5  Past Medical History:  Past Medical History:   Diagnosis Date   • Back problem    • Cervical disc disorder     Surgery    • Cochlear implant in place    • Headache    • HL (hearing loss)     Right ear only   • Hypertension    • Migraine     Wake up with them frequently   • Narcotic habituation, continuous (HCC) 2018   • Neck pain    • Spinal stenosis      Past Surgical History:  Past Surgical History:   Procedure Laterality Date   • APPENDECTOMY     • AXILLARY LYMPH NODE BIOPSY/EXCISION     • CERVICAL LYMPH NODE BIOPSY/EXCISION     • COCHLEAR IMPLANT REVISION     • EPIDURAL BLOCK     • INTERVENTIONAL RADIOLOGY PROCEDURE N/A 2022    Procedure: Cervical myelogram;  Surgeon: Chandrakant Monge MD;  Location: [facility] INVASIVE LOC[facility];  Service: Interventional Radiology;  Laterality: N/A;   • KNEE SURGERY     • NECK SURGERY  2017    C5-7 ACDF- Dr. Nikhil Barker.     Social Hx:  Social History     Tobacco Use   • Smoking status: Former     Packs/day: 1.00     Years: 12.00     Pack years: 12.00     Types: Cigarettes     Quit date: 2008     Years since quittin.9   • Smokeless tobacco: Never   Vaping Use   • Vaping Use: Never used   Substance Use Topics   • Alcohol use: Not Currently   • Drug use: Never     Family Hx:  Family History   Problem Relation Age of Onset   • Other Mother         Polio, whooping cough   • Arthritis Mother    • Atrial fibrillation Mother    • Asthma Mother    • Alzheimer's disease Father    • Liver cancer Maternal Grandmother      Review of Systems:        Review of Systems   Constitutional: Negative for activity change, appetite change, chills, diaphoresis, fatigue, fever and unexpected weight change.   HENT: Negative for congestion, dental problem, drooling, ear discharge, ear pain, facial swelling, hearing loss, mouth sores, nosebleeds, postnasal drip, rhinorrhea,  sinus pressure, sinus pain, sneezing, sore throat, tinnitus, trouble swallowing and voice change.    Eyes: Negative for photophobia, pain, discharge, redness, itching and visual disturbance.   Respiratory: Negative for apnea, cough, choking, chest tightness, shortness of breath, wheezing and stridor.    Cardiovascular: Negative for chest pain, palpitations and leg swelling.   Gastrointestinal: Negative for abdominal distention, abdominal pain, anal bleeding, blood in stool, constipation, diarrhea, nausea, rectal pain and vomiting.   Endocrine: Negative for cold intolerance, heat intolerance, polydipsia, polyphagia and polyuria.   Genitourinary: Negative for decreased urine volume, difficulty urinating, dysuria, enuresis, flank pain, frequency, genital sores, hematuria, penile discharge, penile pain, penile swelling, scrotal swelling, testicular pain and urgency.   Musculoskeletal: Positive for neck pain and neck stiffness. Negative for arthralgias, back pain, gait problem, joint swelling and myalgias.   Skin: Negative for color change, pallor, rash and wound.   Allergic/Immunologic: Negative for environmental allergies, food allergies and immunocompromised state.   Neurological: Negative for dizziness, tremors, seizures, syncope, facial asymmetry, speech difficulty, weakness, light-headedness, numbness and headaches.   Hematological: Negative for adenopathy. Does not bruise/bleed easily.   Psychiatric/Behavioral: Negative for agitation, behavioral problems, confusion, decreased concentration, dysphoric mood, hallucinations, self-injury, sleep disturbance and suicidal ideas. The patient is not nervous/anxious and is not hyperactive.             Physical Examination:  Vitals:    12/15/22 1525   BP: 128/78   Temp: 97.8 °F (36.6 °C)      General Appearance:   Well developed, well nourished, well groomed, alert, and cooperative.  Neurological examination:  Neurologic Exam  COVID mass left in place    Is strong in his  upper extremities    Stigmata of arthritis in the hands    He has a positive Tinel's right wrist is mild    He has no Vaughn's clonus long track signs and his gait and station is normal    Review of Imaging/DATA:  I reviewed a cervical spine CT as well as myelographic imaging personally EMG nerve conduction studies reviewed the studies his cervical myelogram is quite open    I see no evidence of high-grade central canal stenosis his CT scan myelogram looks intact as well  He has right-sided moderate carpal tunnel    Chronic left C5-6 radiculopathy  Diagnoses/Plan:    Mr. Miller is a 57 y.o. male   1.  Axial neck pain    2.  Right-sided carpal tunnel moderate    3.  Left-sided C5-6 chronic radiculopathy-previously operated looks good on OCT    4.  Migraines    Spent 30 minutes with he and his wife face-to-face explaining the nonsurgical nature of his issue I would not recommend spinal stimulation at present he does not have deafferentation pain and I suspect with his degree of underlying mild spinal stenosis open laminectomy will portend a worse prognosis also explained the risk of migration issues infections etc. and I think it is unlikely given the chronicity of his symptoms to produce durable help additionally he reports that his cervical spinal stimulator produced majority of his neck pain relief with with respect to his migraines which has been medicated well with Topamax as a first-line medicine    My recommendations physical therapy    I recommend pain psychology    I talked about all the iterations and how chronic pain can affect this patient    From a neurosurgical standpoint I have nothing to offer I recommended second opinions should he wish to seek them    They are quite frustrated about the treatment options going forward but right now I would defer any interventional management from a neurosurgical standpoint    He can be return to interventional pain management for ongoing medicines I also think it is  reasonable to revisit oral pain medication strategies as well

## 2022-12-17 DIAGNOSIS — M54.12 CERVICAL RADICULOPATHY: ICD-10-CM

## 2022-12-19 RX ORDER — GABAPENTIN 300 MG/1
CAPSULE ORAL
Qty: 90 CAPSULE | Refills: 1 | Status: SHIPPED | OUTPATIENT
Start: 2022-12-19 | End: 2023-02-20

## 2023-01-17 ENCOUNTER — TELEPHONE (OUTPATIENT)
Dept: NEUROLOGY | Facility: CLINIC | Age: 58
End: 2023-01-17
Payer: COMMERCIAL

## 2023-01-17 RX ORDER — TOPIRAMATE 50 MG/1
50 TABLET, FILM COATED ORAL 2 TIMES DAILY
Qty: 60 TABLET | Refills: 5 | Status: SHIPPED | OUTPATIENT
Start: 2023-01-17

## 2023-01-17 NOTE — TELEPHONE ENCOUNTER
DELETE AFTER REVIEWING: Send the encounter HIGH priority, If patient has less than a 3 day supply. If the patient will run out of medication over the weekend add that information to the additional details line. Send this encounter to the clinical pool.    Caller: MILI WINCHESTER    Relationship:     Best call back number: [phone]    Requested Prescriptions:   Requested Prescriptions     Pending Prescriptions Disp Refills   • topiramate (Topamax) 50 MG tablet 60 tablet 5     Sig: Take 1 tablet by mouth 2 (Two) Times a Day.        Pharmacy where request should be sent: [facility], [address] - [phone]  - [phone] FX     Additional details provided by patient: REQUESTING 90 DAY SUPPLY OF THIS MEDICATION TO BE SENT TO [facility]    Does the patient have less than a 3 day supply:  [x] Yes  [] No    Would you like a call back once the refill request has been completed: [] Yes [x] No    If the office needs to give you a call back, can they leave a voicemail: [] Yes [x] No    Lisa López Rep   01/17/23 12:02 EST

## 2023-02-08 ENCOUNTER — APPOINTMENT (OUTPATIENT)
Dept: NEUROLOGY | Facility: HOSPITAL | Age: 58
End: 2023-02-08

## 2023-02-09 ENCOUNTER — OFFICE VISIT (OUTPATIENT)
Dept: NEUROLOGY | Facility: CLINIC | Age: 58
End: 2023-02-09
Payer: COMMERCIAL

## 2023-02-09 VITALS
BODY MASS INDEX: 36.39 KG/M2 | HEART RATE: 97 BPM | HEIGHT: 70 IN | OXYGEN SATURATION: 98 % | SYSTOLIC BLOOD PRESSURE: 120 MMHG | WEIGHT: 254.2 LBS | DIASTOLIC BLOOD PRESSURE: 86 MMHG | TEMPERATURE: 97.3 F

## 2023-02-09 DIAGNOSIS — G43.719 INTRACTABLE CHRONIC MIGRAINE WITHOUT AURA AND WITHOUT STATUS MIGRAINOSUS: Primary | ICD-10-CM

## 2023-02-09 DIAGNOSIS — M54.12 CERVICAL RADICULOPATHY: ICD-10-CM

## 2023-02-09 DIAGNOSIS — G47.33 OBSTRUCTIVE SLEEP APNEA: ICD-10-CM

## 2023-02-09 PROCEDURE — 99214 OFFICE O/P EST MOD 30 MIN: CPT | Performed by: PSYCHIATRY & NEUROLOGY

## 2023-02-09 NOTE — PROGRESS NOTES
Subjective:    CC: Jos Miller is seen today  for Migraine (Intractable chronic migraine w/out aura )       Current visit- since the last visit he started taking Topamax now at 50 mg twice a day that has helped tremendously with the headaches.  He only had about 2-3 headaches in the past month for which she took Relpax 20 mg.  He still continues to have significant paresthesias in both arms and occasionally his legs/feet with numbness of his thumbs bilaterally as well as pain in his left deltoid muscle.  He had an EMG/NCS that showed borderline right median neuropathy in addition to chronic C5-6 radiculopathy with no findings in the left upper extremity.  With regards to his sleep apnea he is yet to schedule a sleep study.  He does wake up occasionally gasping for air.    Initial visit-57 year old male with a h/o chronic neck pain s/p cervical fusion surgery at C5-6-7, HTN, hearing loss in right ear s/p cochlear implant p/w headaches. As per patient he has had headaches for several years that start of in the back of his neck/head mainly on the left radiating to the front to behind his eye with occasional nausea, photophobia and phonophobia.  The headaches reach 8/10 in severity.  He has tried over-the-counter medications such as NSAIDs that do not help much.  He has also tried his wife's Relpax that helps within an hour.  He has also had problems with chronic neck pain with tingling and numbness in his arms/hands that has been ongoing for a few years.  He had neck fusion surgery at C5-6-7 in May 2018 that helped relieve his symptoms for some time but they have returned now.  He saw neurosurgery last year and they ordered a MRI of the cervical spine for him that showed multilevel disc bulges/degenerative disc disease with right more than left neural foraminal stenosis at C3-4 and C4-5 as well as post fusion changes in the lower cervical levels.  After that he saw pain management and they gave him epidural nerve  blocks that did not help.  He also had a trial for a spinal cord stimulator that helped tremendously with his headaches/some of his neck pain but not with the paresthesias in his hands.  He recently saw Dr. Alvarez again and he felt that a spinal cord stimulator will not help him long-term.  He has ordered an EMG/NCS for him to rule out compression neuropathies.  He also has hearing loss in his right ear that occurred after his previous neck surgery for which he has a cochlear implant.  Denies any tinnitus/hearing changes with his headaches that occur about 3-5 times a week.  He also reports to snoring at night and does have daytime somnolence/fatigue.  Currently takes gabapentin 300 mg 3 times a day for neck pain that was started last year as well as tizanidine as needed that was started recently.  Of note- I personally reviewed his MRI cervical spine, Dr. Alvarez's note and Dr. Ritter's note    The following portions of the patient's history were reviewed today and updated as of 11/17/2022  : allergies, current medications, past family history, past medical history, past surgical history and problem list  These document will be scanned to patient's chart.      Current Outpatient Medications:   •  Cholecalciferol (vitamin D3) 125 MCG (5000 UT) tablet tablet, Take 125 mcg by mouth Daily., Disp: , Rfl:   •  eletriptan (RELPAX) 20 MG tablet, Take 1 tablet by mouth As Needed for Migraine (Do not take over 2 tabs a day or 8 tabs a month)., Disp: 8 tablet, Rfl: 3  •  gabapentin (NEURONTIN) 300 MG capsule, TAKE 1 CAPSULE BY MOUTH THREE TIMES DAILY, Disp: 90 capsule, Rfl: 1  •  Gel Base gel, 2 g 4 (Four) Times a Day. prilocaine 2%, lidocaine 10%, imipramine 3%, capsaicin 0.001% and mannitol 20%, Disp: 240 g, Rfl: 5  •  lisinopril (PRINIVIL,ZESTRIL) 20 MG tablet, Take 20 mg by mouth Daily., Disp: , Rfl:   •  tiZANidine (ZANAFLEX) 2 MG tablet, 1/2 to 1 tab TID PRN muscle spasm, Disp: 180 tablet, Rfl: 1  •  tiZANidine  (ZANAFLEX) 2 MG tablet, Take 1 tablet by mouth Every 8 (Eight) Hours As Needed for Muscle Spasms. Start 1/2 tab QHS, then continue 1/2 tab to 1 tab TID PRN muscle spasms, Disp: 30 tablet, Rfl: 0  •  topiramate (Topamax) 50 MG tablet, Take 1 tablet by mouth 2 (Two) Times a Day., Disp: 60 tablet, Rfl: 5   Past Medical History:   Diagnosis Date   • Back problem    • Cervical disc disorder     Surgery 2016   • Cochlear implant in place    • Headache    • HL (hearing loss)     Right ear only   • Hypertension    • Migraine     Wake up with them frequently   • Narcotic habituation, continuous (HCC) 07/16/2018   • Neck pain    • Spinal stenosis 2015      Past Surgical History:   Procedure Laterality Date   • APPENDECTOMY  1980   • AXILLARY LYMPH NODE BIOPSY/EXCISION  1997   • CERVICAL LYMPH NODE BIOPSY/EXCISION  1997   • COCHLEAR IMPLANT REVISION  2017   • EPIDURAL BLOCK  2022   • INTERVENTIONAL RADIOLOGY PROCEDURE N/A 01/19/2022    Procedure: Cervical myelogram;  Surgeon: Chandrakant Monge MD;  Location: [facility] INVASIVE LOCATION;  Service: Interventional Radiology;  Laterality: N/A;   • KNEE SURGERY     • NECK SURGERY  2017    C5-7 ACDF- Dr. Nikhil Barker.      Family History   Problem Relation Age of Onset   • Other Mother         Polio, whooping cough   • Arthritis Mother    • Atrial fibrillation Mother    • Asthma Mother    • Alzheimer's disease Father    • Liver cancer Maternal Grandmother       Social History     Socioeconomic History   • Marital status:    Tobacco Use   • Smoking status: Former     Packs/day: 1.00     Years: 12.00     Pack years: 12.00     Types: Cigarettes     Quit date: 1/1/2008     Years since quitting: 15.1   • Smokeless tobacco: Never   Vaping Use   • Vaping Use: Never used   Substance and Sexual Activity   • Alcohol use: Not Currently   • Drug use: Never   • Sexual activity: Yes     Partners: Female     Birth control/protection: None     Review of Systems   Musculoskeletal:  "Positive for neck pain and neck stiffness.   Neurological: Positive for numbness and headache.   All other systems reviewed and are negative.      Objective:    /86   Pulse 97   Temp 97.3 °F (36.3 °C) (Temporal)   Ht 177.8 cm (70\")   Wt 115 kg (254 lb 3.2 oz)   SpO2 98%   BMI 36.47 kg/m²     Neurology Exam:    General apperance: Obese, no tenderness to palpation of his greater or lesser occipital regions on both sides.  Reduced neck mobility in all directions    Mental status: Alert, awake and oriented to time place and person.    Recent and Remote memory: Intact.    Attention span and Concentration: Normal.     Language and Speech: Intact- No dysarthria.    Fluency, Naming , Repitition and Comprehension:  Intact    Cranial Nerves:   CN II: Visual fields are full. Intact. Fundi - Normal, No papillederma, Pupils - AVANI  CN III, IV and VI: Extraocular movements are intact. Normal saccades.   CN V: Facial sensation is intact.   CN VII: Muscles of facial expression reveal no asymmetry. Intact.   CN VIII: Hearing is intact. Whispered voice intact.   CN IX and X: Palate elevates symmetrically. Intact  CN XI: Shoulder shrug is intact.   CN XII: Tongue is midline without evidence of atrophy or fasciculation.     Ophthalmoscopic exam of optic disc-normal    Motor:  Right UE muscle strength 5/5. Normal tone.     Left UE muscle strength 5/5. Normal tone.      Right LE muscle strength5/5. Normal tone.     Left LE muscle strength 5/5. Normal tone.      Sensory: Normal light touch, vibration and pinprick sensation bilaterally.    DTRs: 2+ bilaterally in upper and lower extremities.    Babinski: Negative bilaterally.    Co-ordination: Normal finger-to-nose, heel to shin B/L.    Rhomberg: Negative.    Gait: Normal.    Cardiovascular: Regular rate and rhythm without murmur, gallop or rub.    Assessment and Plan:  1. Intractable chronic migraine without aura and without status migrainosus  He could have cervico-occipital " neuralgia/migraine headaches  Since he did not have occipital tenderness I did not start him on occipital nerve blocks  His headaches have improved vastly with Topamax therefore she should continue her dose of 50 mg twice a day  For abortive treatment of his headaches he can continue Relpax 20 mg as needed  If Topamax does not help in the future I may proceed with occipital nerve block/trigger point injections  I have once again counseled him to continue keeping himself well-hydrated on Topamax    2.  Obstructive sleep apnea  I have told him to schedule his sleep medicine appointment    3.  Cervical radiculopathy  EMG/NCS showed borderline right carpal tunnel as well as chronic C5-6 radiculopathy on the right  MRI cervical spine did show multilevel disc bulges/degenerative disc changes with right more than left neural foraminal stenosis at C3-4-5   He has previously had epidural nerve blocks without much benefit and has also been assessed for spinal cord stimulator  Already on gabapentin 300 mg 3 times a day which he does not want to increase  I discussed going for PT but he wants to wait due to his busy work schedule  I have also counseled him to wear a wrist brace on the right for his median neuropathy especially at night         Return in about 5 months (around 7/9/2023).     I spent over 25 minutes with the patient face to face out of which over 50% (20 minutes) was spent in management, instructions and education.     Nikki Salter MD

## 2023-02-20 ENCOUNTER — TELEPHONE (OUTPATIENT)
Dept: PAIN MEDICINE | Facility: CLINIC | Age: 58
End: 2023-02-20
Payer: COMMERCIAL

## 2023-02-20 DIAGNOSIS — M54.12 CERVICAL RADICULOPATHY: ICD-10-CM

## 2023-02-20 RX ORDER — GABAPENTIN 300 MG/1
CAPSULE ORAL
Qty: 90 CAPSULE | Refills: 0 | Status: SHIPPED | OUTPATIENT
Start: 2023-02-20 | End: 2023-03-20

## 2023-03-18 DIAGNOSIS — M54.12 CERVICAL RADICULOPATHY: ICD-10-CM

## 2023-03-20 RX ORDER — GABAPENTIN 300 MG/1
CAPSULE ORAL
Qty: 90 CAPSULE | Refills: 0 | Status: SHIPPED | OUTPATIENT
Start: 2023-03-20

## 2023-04-10 RX ORDER — TIZANIDINE 2 MG/1
TABLET ORAL
Qty: 180 TABLET | Refills: 1 | Status: CANCELLED | OUTPATIENT
Start: 2023-04-10

## 2023-04-13 DIAGNOSIS — M54.12 CERVICAL RADICULOPATHY: ICD-10-CM

## 2023-04-13 RX ORDER — TIZANIDINE 2 MG/1
2 TABLET ORAL EVERY 8 HOURS PRN
Qty: 30 TABLET | Refills: 0 | Status: SHIPPED | OUTPATIENT
Start: 2023-04-13

## 2023-04-20 DIAGNOSIS — M54.12 CERVICAL RADICULOPATHY: ICD-10-CM

## 2023-04-20 RX ORDER — GABAPENTIN 300 MG/1
300 CAPSULE ORAL 3 TIMES DAILY
Qty: 90 CAPSULE | Refills: 2 | Status: SHIPPED | OUTPATIENT
Start: 2023-04-20

## 2023-04-21 RX ORDER — TIZANIDINE 2 MG/1
TABLET ORAL
Qty: 90 TABLET | Refills: 1 | Status: SHIPPED | OUTPATIENT
Start: 2023-04-21

## 2023-04-21 NOTE — TELEPHONE ENCOUNTER
Patient's wife called, states he is taking tizanidine TID [not PRN] and was only given 30 quantity instead of 90, and since they were told they would be given enough quantity to hold them until over until mid may's follow up with PCP to take over RX.

## 2023-09-12 NOTE — TELEPHONE ENCOUNTER
Rx Refill Note  Requested Prescriptions     Pending Prescriptions Disp Refills   • topiramate (Topamax) 50 MG tablet 60 tablet 5     Sig: Take 1 tablet by mouth 2 (Two) Times a Day.      Last filled: 11/17/22 with 5 refills sent in   Last office visit with prescribing clinician: 11/17/2022      Next office visit with prescribing clinician: 2/9/2023     Pina Ashley MA  01/17/23, 15:24 EST   8

## 2023-11-07 RX ORDER — TIZANIDINE 2 MG/1
TABLET ORAL
Qty: 180 TABLET | Refills: 3 | OUTPATIENT
Start: 2023-11-07

## 2023-11-10 RX ORDER — TOPIRAMATE 50 MG/1
50 TABLET, FILM COATED ORAL 2 TIMES DAILY
Qty: 60 TABLET | Refills: 5 | Status: SHIPPED | OUTPATIENT
Start: 2023-11-10

## 2023-11-10 NOTE — TELEPHONE ENCOUNTER
Rx Refill Note  Requested Prescriptions     Pending Prescriptions Disp Refills    topiramate (Topamax) 50 MG tablet 60 tablet 5     Sig: Take 1 tablet by mouth 2 (Two) Times a Day.      Last filled:  Last office visit with prescribing clinician: 7/10/2023      Next office visit with prescribing clinician: 1/10/2024     Lani Fong MA  11/10/23, 13:20 EST

## 2023-11-10 NOTE — TELEPHONE ENCOUNTER
PHARMACY CALLING TO [facility] - THIS REFILL ORDER NEEDS TO BE FILLED WITH THEM DUE TO INSURANCE.    PLEASE CANCEL ANY REFILL AT [facility] AND SEND TO [facility] - [facility]IL ORDER    THANK YOU

## 2024-01-10 ENCOUNTER — OFFICE VISIT (OUTPATIENT)
Dept: NEUROLOGY | Facility: CLINIC | Age: 59
End: 2024-01-10
Payer: COMMERCIAL

## 2024-01-10 VITALS
SYSTOLIC BLOOD PRESSURE: 108 MMHG | OXYGEN SATURATION: 96 % | HEART RATE: 96 BPM | DIASTOLIC BLOOD PRESSURE: 72 MMHG | WEIGHT: 241 LBS | BODY MASS INDEX: 34.5 KG/M2 | HEIGHT: 70 IN

## 2024-01-10 DIAGNOSIS — M54.12 CERVICAL RADICULOPATHY: ICD-10-CM

## 2024-01-10 DIAGNOSIS — G43.719 INTRACTABLE CHRONIC MIGRAINE WITHOUT AURA AND WITHOUT STATUS MIGRAINOSUS: Primary | ICD-10-CM

## 2024-01-10 PROCEDURE — 99213 OFFICE O/P EST LOW 20 MIN: CPT | Performed by: PSYCHIATRY & NEUROLOGY

## 2024-01-10 RX ORDER — TOPIRAMATE 50 MG/1
50 TABLET, FILM COATED ORAL 2 TIMES DAILY
Qty: 60 TABLET | Refills: 11 | Status: SHIPPED | OUTPATIENT
Start: 2024-01-10

## 2024-01-10 NOTE — PROGRESS NOTES
Subjective:    CC: Jos Miller is seen today  for headaches, paresthesias    Current visit-his headaches remain well-controlled and he has had only 3 episodes since he last saw me in July 1 that lasted for about 2 days.  Continues to take Topamax 50 mg twice daily and Relpax as needed.  At his last visit he was having flank pain but fortunately did not have any kidney stones.  He also continues to have neck pain as well as tingling and numbness in both hands worse on the right more so while driving.  His last EMG/NCS done in December 2022 showed chronic right C5-6 radiculopathy as well as mild carpal tunnel on the right with normal examination on the left.  He does wear a wrist brace on the right at night.  He also complains of dizziness when he tilts his head back like reaching for something overhead or bending over.    Last visit- patient states that his headaches remain extremely well controlled as he has only had about 3 headaches in the past 5 months for which he took Relpax.  He continues to take Topamax 50 mg twice daily without any side effects however recently has been having some flank pain.  Thinks he may have a kidney stone for which she will talk to his PCP.  His neck pain radiating down into his arms has improved.  He has also lost 14 pounds since his last visit as he has been walking up to 2 miles a day.  He does get some pain in his thumb that radiates up into his forearms and upper arms while walking.  Does wear wrist braces at night.  With regards to his sleep apnea he has not had a sleep test.  Will be going on his wife's insurance in August.  Will think about it then.    Last visit -he started taking Topamax now at 50 mg twice a day that has helped tremendously with the headaches.  He only had about 2-3 headaches in the past month for which she took Relpax 20 mg.  He still continues to have significant paresthesias in both arms and occasionally his legs/feet with numbness of his thumbs  bilaterally as well as pain in his left deltoid muscle.  He had an EMG/NCS that showed borderline right median neuropathy in addition to chronic C5-6 radiculopathy with no findings in the left upper extremity.  With regards to his sleep apnea he is yet to schedule a sleep study.  He does wake up occasionally gasping for air.    Initial visit-57 year old male with a h/o chronic neck pain s/p cervical fusion surgery at C5-6-7, HTN, hearing loss in right ear s/p cochlear implant p/w headaches. As per patient he has had headaches for several years that start of in the back of his neck/head mainly on the left radiating to the front to behind his eye with occasional nausea, photophobia and phonophobia.  The headaches reach 8/10 in severity.  He has tried over-the-counter medications such as NSAIDs that do not help much.  He has also tried his wife's Relpax that helps within an hour.  He has also had problems with chronic neck pain with tingling and numbness in his arms/hands that has been ongoing for a few years.  He had neck fusion surgery at C5-6-7 in May 2018 that helped relieve his symptoms for some time but they have returned now.  He saw neurosurgery last year and they ordered a MRI of the cervical spine for him that showed multilevel disc bulges/degenerative disc disease with right more than left neural foraminal stenosis at C3-4 and C4-5 as well as post fusion changes in the lower cervical levels.  After that he saw pain management and they gave him epidural nerve blocks that did not help.  He also had a trial for a spinal cord stimulator that helped tremendously with his headaches/some of his neck pain but not with the paresthesias in his hands.  He recently saw Dr. Alvarez again and he felt that a spinal cord stimulator will not help him long-term.  He has ordered an EMG/NCS for him to rule out compression neuropathies.  He also has hearing loss in his right ear that occurred after his previous neck surgery for  which he has a cochlear implant.  Denies any tinnitus/hearing changes with his headaches that occur about 3-5 times a week.  He also reports to snoring at night and does have daytime somnolence/fatigue.  Currently takes gabapentin 300 mg 3 times a day for neck pain that was started last year as well as tizanidine as needed that was started recently.  Of note- I personally reviewed his MRI cervical spine, Dr. Alvarez's note and Dr. Ritter's note    The following portions of the patient's history were reviewed today and updated as of 11/17/2022  : allergies, current medications, past family history, past medical history, past surgical history and problem list  These document will be scanned to patient's chart.      Current Outpatient Medications:     Cholecalciferol (vitamin D3) 125 MCG (5000 UT) tablet tablet, Take 125 mcg by mouth Daily., Disp: , Rfl:     eletriptan (RELPAX) 20 MG tablet, Take 1 tablet by mouth As Needed for Migraine (Do not take over 2 tabs a day or 8 tabs a month)., Disp: 8 tablet, Rfl: 3    gabapentin (NEURONTIN) 300 MG capsule, Take 1 capsule by mouth 3 (Three) Times a Day., Disp: 90 capsule, Rfl: 2    lisinopril (PRINIVIL,ZESTRIL) 20 MG tablet, Take 1 tablet by mouth Daily., Disp: , Rfl:     tiZANidine (ZANAFLEX) 4 MG tablet, TAKE 1 TABLET BY MOUTH THREE TIMES DAILY AS NEEDED FOR PAIN OR SPASM, Disp: , Rfl:     topiramate (Topamax) 50 MG tablet, Take 1 tablet by mouth 2 (Two) Times a Day., Disp: 60 tablet, Rfl: 11   Past Medical History:   Diagnosis Date    Back problem     Cervical disc disorder     Surgery 2016    Cochlear implant in place     Headache     HL (hearing loss)     Right ear only    Hypertension     Migraine     Wake up with them frequently    Narcotic habituation, continuous 07/16/2018    Neck pain     Spinal stenosis 2015      Past Surgical History:   Procedure Laterality Date    APPENDECTOMY  1980    AXILLARY LYMPH NODE BIOPSY/EXCISION  1997    CERVICAL LYMPH NODE  "BIOPSY/EXCISION  1997    COCHLEAR IMPLANT REVISION  2017    EPIDURAL BLOCK      INTERVENTIONAL RADIOLOGY PROCEDURE N/A 2022    Procedure: Cervical myelogram;  Surgeon: Chandrakant Monge MD;  Location: [facility] INVASIVE LOCATION;  Service: Interventional Radiology;  Laterality: N/A;    KNEE SURGERY      NECK SURGERY  2017    C5-7 ACDF- Dr. Nikhil Barker.      Family History   Problem Relation Age of Onset    Other Mother         Polio, whooping cough    Arthritis Mother     Atrial fibrillation Mother     Asthma Mother     Alzheimer's disease Father     Liver cancer Maternal Grandmother       Social History     Socioeconomic History    Marital status:    Tobacco Use    Smoking status: Former     Packs/day: 1.00     Years: 12.00     Additional pack years: 0.00     Total pack years: 12.00     Types: Cigarettes     Quit date: 2008     Years since quittin.0    Smokeless tobacco: Never   Vaping Use    Vaping Use: Never used   Substance and Sexual Activity    Alcohol use: Not Currently    Drug use: Never    Sexual activity: Yes     Partners: Female     Birth control/protection: None     Review of Systems   Musculoskeletal:  Positive for neck pain and neck stiffness.   Neurological:  Positive for numbness and headache.   All other systems reviewed and are negative.      Objective:    /72   Pulse 96   Ht 177.8 cm (70\")   Wt 109 kg (241 lb)   SpO2 96%   BMI 34.58 kg/m²     Neurology Exam:    General apperance: Obese    Mental status: Alert, awake and oriented to time place and person.    Recent and Remote memory: Intact.    Attention span and Concentration: Normal.     Language and Speech: Intact- No dysarthria.    Fluency, Naming , Repitition and Comprehension:  Intact    Cranial Nerves:   CN II: Visual fields are full. Intact. Fundi - Normal, No papillederma, Pupils - AVANI  CN III, IV and VI: Extraocular movements are intact. Normal saccades.   CN V: Facial sensation is intact.   CN " VII: Muscles of facial expression reveal no asymmetry. Intact.   CN VIII: Hearing is intact. Whispered voice intact.   CN IX and X: Palate elevates symmetrically. Intact  CN XI: Shoulder shrug is intact.   CN XII: Tongue is midline without evidence of atrophy or fasciculation.     Ophthalmoscopic exam of optic disc-deferred at    Motor:  Right UE muscle strength 5/5. Normal tone.     Left UE muscle strength 5/5. Normal tone.      Right LE muscle strength5/5. Normal tone.     Left LE muscle strength 5/5. Normal tone.      Sensory: Normal light touch, vibration and pinprick sensation bilaterally.    DTRs: 2+ bilaterally in upper and lower extremities.    Babinski: Negative bilaterally.    Co-ordination: Normal finger-to-nose, heel to shin B/L.    Rhomberg: Negative.    Gait: Normal.    Cardiovascular: Regular rate and rhythm without murmur, gallop or rub.    Assessment and Plan:  1. Intractable chronic migraine without aura and without status migrainosus    I have asked him to continue Topamax 50 mg twice daily.    For abortive treatment of his headaches he can continue Relpax 20 mg as needed  He does keep himself well-hydrated    2.  Cervical radiculopathy  EMG/NCS showed borderline right carpal tunnel as well as chronic C5-6 radiculopathy on the right  MRI cervical spine did show multilevel disc bulges/degenerative disc changes with right more than left neural foraminal stenosis at C3-4-5   Already on gabapentin 300 mg 3 times a day which he does not want to increase  He does wear a wrist brace on the right for his median neuropathy especially at night.  I have told him to let me know if symptoms worsen and I will order a repeat EMG/NCS  Also his dizziness while looking up could be due to vertebrobasilar insufficiency.  Counseled on postural modifications    3.  Sleep apnea  Did not have a sleep study but has lost weight since he tries to walk 2 miles daily         Return in about 1 year (around 1/10/2025).     I  spent over 25 minutes with the patient face to face out of which over 50% (17 minutes) was spent in management, instructions and education.     Nikki Salter MD

## 2024-08-04 NOTE — PROGRESS NOTES
History  Chief Complaint   Patient presents with    Dental Pain     Pt c/o upper right sided dental pain for 2 days. Pt states he was brushing teeth and a tooth broke off. No meds prior to arrival. Does not have a dentist.      The patient is a 41-year-old male with a past medical history of epilepsy, who presents for evaluation of dental pain.  He reports 2-3 days of right upper dental pain and facial swelling.  He reports breaking a piece of his tooth off approximately 2 months ago while brushing his teeth and he can still feel the hole in his tooth with his tongue.  He believes that there is now an abscess in the hole.  Denies difficulty tolerating secretions, dysphagia, trismus, sore throat, shortness of breath, or F/C.  No medications taken PTA.        Prior to Admission Medications   Prescriptions Last Dose Informant Patient Reported? Taking?   albuterol (ProAir HFA) 90 mcg/act inhaler   No No   Sig: Inhale 2 puffs every 6 (six) hours as needed for wheezing   buPROPion (WELLBUTRIN) 75 mg tablet   No No   Sig: Take 1 tablet (75 mg total) by mouth 2 (two) times a day for 7 days   levETIRAcetam (KEPPRA) 750 mg tablet   Yes No   Sig: Take 750 mg by mouth 2 (two) times a day   levETIRAcetam (Keppra) 750 mg tablet   No No   Sig: Take 1 tablet (750 mg total) by mouth 2 (two) times a day      Facility-Administered Medications: None       Past Medical History:   Diagnosis Date    Epilepsy (HCC)        History reviewed. No pertinent surgical history.    History reviewed. No pertinent family history.  I have reviewed and agree with the history as documented.    E-Cigarette/Vaping    E-Cigarette Use Never User      E-Cigarette/Vaping Substances     Social History     Tobacco Use    Smoking status: Every Day     Types: Cigarettes, Cigars    Smokeless tobacco: Never   Vaping Use    Vaping status: Never Used   Substance Use Topics    Alcohol use: Yes     Comment: occ    Drug use: Yes     Frequency: 4.0 times per week      You have chosen to receive care through a telephone visit. Do you consent to use a telephone visit for your medical care today? YES    15 minutes    Patient: Jos Miller  : 1965    Primary Care Provider: Kain Gordon MD      Chief Complaint: X-ray review    History of Present Illness:       Patient is a nice 56-year-old gentleman who is evaluated in neurosurgical practice for neck and right arm pain.  Patient is not naïve to neurosurgery and has had a C5-C7 anterior cervical discectomy and fusion done in [place] when he lived there.  This procedure is done 3 years ago.  Patient did relatively well had intermittent flareups that went away with time but about 4 months ago noticed he had a really bad crick in his neck that started radiating down to the right trapezius into the right deltoid and on down in his arm was more numbness.  Patient is really having a lot of trouble with tipping his head backward.     Patient has given this quite a bit of time but not yet done physical therapy.  Patient is not done injections but has been on gabapentin that is helping about 20%.     This is giving him fits daily and he wishes to try to be treated conservatively which is what we will try.     Patient is called today for review of his x-rays.  X-rays show no abnormal subluxation and good fusion of C5-7. patient is scheduled to have his first injections a week from Friday.  Will await his call back after that    Review of Systems   Constitutional: Negative for activity change, appetite change, chills, diaphoresis, fatigue, fever and unexpected weight change.   HENT: Negative for congestion, dental problem, drooling, ear discharge, ear pain, facial swelling, hearing loss, mouth sores, nosebleeds, postnasal drip, rhinorrhea, sinus pressure, sneezing, sore throat, tinnitus, trouble swallowing and voice change.    Eyes: Negative for photophobia, pain, discharge, redness, itching and visual disturbance.   Respiratory:  Negative for apnea, cough, choking, chest tightness, shortness of breath, wheezing and stridor.    Cardiovascular: Negative for chest pain, palpitations and leg swelling.   Gastrointestinal: Negative for abdominal distention, abdominal pain, anal bleeding, blood in stool, constipation, diarrhea, nausea, rectal pain and vomiting.   Endocrine: Negative for cold intolerance, heat intolerance, polydipsia, polyphagia and polyuria.   Genitourinary: Negative for decreased urine volume, difficulty urinating, dysuria, enuresis, flank pain, frequency, genital sores, hematuria and urgency.   Musculoskeletal: Positive for myalgias and neck pain. Negative for arthralgias, back pain, gait problem, joint swelling and neck stiffness.   Skin: Negative for color change, pallor, rash and wound.   Allergic/Immunologic: Negative for environmental allergies, food allergies and immunocompromised state.   Neurological: Negative for dizziness, tremors, seizures, syncope, facial asymmetry, speech difficulty, weakness, light-headedness, numbness and headaches.   Hematological: Negative for adenopathy. Does not bruise/bleed easily.   Psychiatric/Behavioral: Negative for agitation, behavioral problems, confusion, decreased concentration, dysphoric mood, hallucinations, self-injury, sleep disturbance and suicidal ideas. The patient is not nervous/anxious and is not hyperactive.    All other systems reviewed and are negative.      Past Medical History:     Past Medical History:   Diagnosis Date   • Back problem    • Headache    • HL (hearing loss)     Right ear only   • Hypertension        Family History:     Family History   Problem Relation Age of Onset   • Other Mother         Polio, whooping cough   • Arthritis Mother    • Atrial fibrillation Mother    • Asthma Mother    • Alzheimer's disease Father    • Liver cancer Maternal Grandmother        Social History:    reports that she quit smoking about 13 years ago. She has a 12.00 pack-year  Types: Marijuana       Review of Systems   Constitutional:  Negative for chills and fever.   HENT:  Positive for dental problem and facial swelling. Negative for drooling, ear pain, rhinorrhea, sinus pressure, sinus pain, sore throat, trouble swallowing and voice change.    Eyes:  Negative for pain and visual disturbance.   Respiratory:  Negative for cough and shortness of breath.    Cardiovascular:  Negative for chest pain and palpitations.   Gastrointestinal:  Negative for abdominal pain, diarrhea, nausea and vomiting.   Genitourinary:  Negative for dysuria and hematuria.   Musculoskeletal:  Negative for arthralgias, back pain and myalgias.   Skin:  Negative for color change and rash.   Neurological:  Negative for seizures, syncope and headaches.   All other systems reviewed and are negative.      Physical Exam  Physical Exam  Vitals and nursing note reviewed.   Constitutional:       General: He is awake. He is not in acute distress.     Appearance: Normal appearance. He is well-developed.   HENT:      Head: Normocephalic and atraumatic.      Jaw: There is normal jaw occlusion. Tenderness, swelling and pain on movement present. No trismus or malocclusion.      Comments: Mild swelling in the right maxillary region.  The area is tender to palpation.  No trismus.     Right Ear: External ear normal.      Left Ear: External ear normal.      Nose: Nose normal.      Mouth/Throat:      Lips: Pink.      Mouth: Mucous membranes are moist. No oral lesions.      Dentition: Abnormal dentition. Dental tenderness and dental caries present. No gingival swelling or gum lesions.      Pharynx: Oropharynx is clear. Uvula midline. No pharyngeal swelling, oropharyngeal exudate, posterior oropharyngeal erythema or uvula swelling.      Comments: Patient is speaking in full sentences and tolerating his secretions without difficulty.  Airway is patent.  Overall poor dentition with multiple dental caries.  The right upper second molar is  "smoking history. She has never used smokeless tobacco. She reports previous alcohol use. She reports that she does not use drugs.   SMOKING STATUS: Non-smoker    Surgical History:     Past Surgical History:   Procedure Laterality Date   • APPENDECTOMY  1980   • AXILLARY LYMPH NODE BIOPSY/EXCISION  1997   • CERVICAL LYMPH NODE BIOPSY/EXCISION  1997   • COCHLEAR IMPLANT REVISION  2017   • NECK SURGERY  2017    Dr. Nikhil Barker.        Allergies:   Patient has no known allergies.    Physical Exam:    Vital Signs:Ht 177.8 cm (70\")   Wt 107 kg (235 lb)   BMI 33.72 kg/m²    BMI: Body mass index is 33.72 kg/m².     Exam limited secondary to telephone encounter    Medical Decision Making    Data Review:   X-rays reviewed showing no acute abnormality and a good fusion C5-C7    Diagnosis:   Cervical radiculopathy    Treatment Options:   We will get his first injection in and see how this helps him.  We may have to get back to the drawing board if this does not help in any way.  If it does help and it fails to quell his symptoms we can consider myelography secondary to his grainy imaging that he brought in initially.    Patient symptoms have not really changed much even after physical therapy and more time.    Patient's Body mass index is 33.72 kg/m². indicating that she is obese (BMI >30). Obesity-related health conditions include the following: none. Obesity is unchanged. BMI is is above average; no BMI management plan is appropriate. We discussed portion control and increasing exercise.     Diagnosis Plan   1. Cervical radiculopathy       " mostly absent, with a large jarod.  The tooth is tender to the touch.  No evidence of pulpitis or dental abscess.  No elevation of the tongue and the floor of mouth is soft.  Eyes:      General: Lids are normal. Vision grossly intact. Gaze aligned appropriately.      Conjunctiva/sclera: Conjunctivae normal.      Pupils: Pupils are equal, round, and reactive to light.   Cardiovascular:      Rate and Rhythm: Normal rate and regular rhythm.   Pulmonary:      Effort: Pulmonary effort is normal. No respiratory distress.   Abdominal:      General: Abdomen is flat. Bowel sounds are normal.      Palpations: Abdomen is soft.      Tenderness: There is no guarding or rebound.   Musculoskeletal:      Cervical back: Normal, full passive range of motion without pain and neck supple. No rigidity or crepitus.      Thoracic back: Normal.      Lumbar back: Normal.   Lymphadenopathy:      Head:      Right side of head: No submental, submandibular or tonsillar adenopathy.      Left side of head: No submental, submandibular or tonsillar adenopathy.      Cervical: No cervical adenopathy.   Skin:     General: Skin is warm.      Capillary Refill: Capillary refill takes less than 2 seconds.      Coloration: Skin is not pale.      Findings: No rash.   Neurological:      Mental Status: He is alert and oriented to person, place, and time.   Psychiatric:         Attention and Perception: Attention normal.         Mood and Affect: Mood normal.         Speech: Speech normal.         Behavior: Behavior normal. Behavior is cooperative.         Vital Signs  ED Triage Vitals [08/04/24 0046]   Temperature Pulse Respirations Blood Pressure SpO2   97.5 °F (36.4 °C) 65 16 148/92 96 %      Temp src Heart Rate Source Patient Position - Orthostatic VS BP Location FiO2 (%)   -- -- -- -- --      Pain Score       --           Vitals:    08/04/24 0046   BP: 148/92   Pulse: 65       ED Medications  Medications   clindamycin (CLEOCIN) capsule 450 mg (450 mg Oral  Given 8/4/24 0059)   ketorolac (TORADOL) injection 15 mg (15 mg Intramuscular Given 8/4/24 0100)       Diagnostic Studies  Results Reviewed       None                   No orders to display              Procedures  Procedures         ED Course           SBIRT 20yo+      Flowsheet Row Most Recent Value   Initial Alcohol Screen: US AUDIT-C     1. How often do you have a drink containing alcohol? 0 Filed at: 08/04/2024 0048   2. How many drinks containing alcohol do you have on a typical day you are drinking?  0 Filed at: 08/04/2024 0048   3a. Male UNDER 65: How often do you have five or more drinks on one occasion? 0 Filed at: 08/04/2024 0048   3b. FEMALE Any Age, or MALE 65+: How often do you have 4 or more drinks on one occassion? 0 Filed at: 08/04/2024 0048   Audit-C Score 0 Filed at: 08/04/2024 0048   JORDAN: How many times in the past year have you...    Used an illegal drug or used a prescription medication for non-medical reasons? Never Filed at: 08/04/2024 0048              Medical Decision Making  The patient presents for right upper dental pain and associated facial swelling.  He is speaking in full sentences and managing his secretions without difficulty.  No airway obstruction.  Differential diagnosis includes but is not limited to dental jarod, dental infection, dental abscess, gingivitis, ANUG, or trevor's angina.  No evidence on exam to suggest dental abscess, ANUG, or trevor's angina.  With the associated facial swelling, will begin a course of antibiotics for a suspected dental infection/abscess.  Prescriptions for the antibiotics and analgesic medications were sent to the patient's pharmacy.  He is in agreement with the treatment plan and all questions were answered.  Strict return precautions discussed and he verbalized understanding.  Follow up with dentist/dental clinic, but return to the ED in the interim with new or worsening symptoms.    Problems Addressed:  Dentalgia: acute illness or  injury    Risk  OTC drugs.  Prescription drug management.           Disposition  Final diagnoses:   Dentalgia     Time reflects when diagnosis was documented in both MDM as applicable and the Disposition within this note       Time User Action Codes Description Comment    8/4/2024 12:55 AM Emy Welch Add [K08.89] Dentalgia           ED Disposition       ED Disposition   Discharge    Condition   Stable    Date/Time   Sun Aug 4, 2024 0057    Comment   Marcus Tremayne Williams discharge to home/self care.                   Follow-up Information       Follow up With Specialties Details Why Contact Info Additional Information    [facility]   [address] [place] [address], [address] [id number] [phone] [facility], [address] [place] [address], [place], [place], [address] [phone]    [facility]    [address] [place] [address]  [phone]             Discharge Medication List as of 8/4/2024 12:58 AM        START taking these medications    Details   acetaminophen (TYLENOL) 500 mg tablet Take 2 tablets (1,000 mg total) by mouth every 6 (six) hours as needed for mild pain, Starting Sun 8/4/2024, Normal      benzocaine (ORABASE-B) 20 % PSTE Apply 1 Application to the mouth or throat 4 (four) times a day as needed for mucositis for up to 5 days, Starting Sun 8/4/2024, Until Fri 8/9/2024 at 2359, Print      clindamycin (CLEOCIN) 150 mg capsule Take 3 capsules (450 mg total) by mouth 3 (three) times a day for 7 days, Starting Sun 8/4/2024, Until Sun 8/11/2024, Print      ibuprofen (MOTRIN) 800 mg tablet Take 1 tablet (800 mg total) by mouth every 8 (eight) hours as needed for mild pain, Starting Sun 8/4/2024, Normal           CONTINUE these medications which have NOT CHANGED    Details   albuterol (ProAir HFA) 90 mcg/act inhaler Inhale 2 puffs every 6 (six) hours as needed for  wheezing, Starting Fri 9/1/2023, Normal      buPROPion (WELLBUTRIN) 75 mg tablet Take 1 tablet (75 mg total) by mouth 2 (two) times a day for 7 days, Starting Mon 9/4/2023, Until Mon 9/11/2023, Normal      !! levETIRAcetam (KEPPRA) 750 mg tablet Take 750 mg by mouth 2 (two) times a day, Historical Med      !! levETIRAcetam (Keppra) 750 mg tablet Take 1 tablet (750 mg total) by mouth 2 (two) times a day, Starting Fri 9/1/2023, Normal       !! - Potential duplicate medications found. Please discuss with provider.          No discharge procedures on file.    PDMP Review       None            ED Provider  Electronically Signed by             Emy Welch PA-C  08/04/24 0113

## 2024-10-14 NOTE — TELEPHONE ENCOUNTER
Rx Refill Note  Requested Prescriptions     Pending Prescriptions Disp Refills    eletriptan (RELPAX) 20 MG tablet 8 tablet 3     Sig: Take 1 tablet by mouth As Needed for Migraine (Do not take over 2 tabs a day or 8 tabs a month).    topiramate (Topamax) 50 MG tablet 60 tablet 11     Sig: Take 1 tablet by mouth 2 (Two) Times a Day.      Last filled:eletriptan        topiramate  Last office visit with prescribing clinician: 1/10/2024      Next office visit with prescribing clinician: 1/13/2025     Lani Fong MA  10/14/24, 15:42 EDT

## 2024-10-14 NOTE — TELEPHONE ENCOUNTER
Caller: BRITTANY    Relationship: WIFE    Best call back number: [phone]    Requested Prescriptions:   Requested Prescriptions     Pending Prescriptions Disp Refills    eletriptan (RELPAX) 20 MG tablet 8 tablet 3     Sig: Take 1 tablet by mouth As Needed for Migraine (Do not take over 2 tabs a day or 8 tabs a month).    topiramate (Topamax) 50 MG tablet     Pharmacy where request should be sent: [id number] [address] AT [facility] [address] - [phone]  - [phone] FX     Last office visit with prescribing clinician: 1/10/2024   Last telemedicine visit with prescribing clinician: Visit date not found   Next office visit with prescribing clinician: 1/13/2025     Additional details provided by patient: PT'S INSURANCE HAS CHANGED, SHE STATES PLEASE SEND ALL MEDICATIONS TO THE LOCAL PHARMACY ABOVE.     Does the patient have less than a 3 day supply:  [] Yes  [x] No    Would you like a call back once the refill request has been completed: [] Yes [x] No    If the office needs to give you a call back, can they leave a voicemail: [] Yes [x] No    Lisa Garcia Rep   10/14/24 09:06 EDT

## 2024-10-17 RX ORDER — TOPIRAMATE 50 MG/1
50 TABLET, FILM COATED ORAL 2 TIMES DAILY
Qty: 60 TABLET | Refills: 2 | Status: SHIPPED | OUTPATIENT
Start: 2024-10-17

## 2024-10-17 RX ORDER — ELETRIPTAN HYDROBROMIDE 20 MG/1
20 TABLET, FILM COATED ORAL AS NEEDED
Qty: 8 TABLET | Refills: 2 | Status: SHIPPED | OUTPATIENT
Start: 2024-10-17 | End: 2025-10-17

## 2024-10-17 NOTE — TELEPHONE ENCOUNTER
Rx Refill Note  Requested Prescriptions     Pending Prescriptions Disp Refills    eletriptan (RELPAX) 20 MG tablet 8 tablet 3     Sig: Take 1 tablet by mouth As Needed for Migraine (Do not take over 2 tabs a day or 8 tabs a month).    topiramate (Topamax) 50 MG tablet 60 tablet 11     Sig: Take 1 tablet by mouth 2 (Two) Times a Day.      Last filled:07/10/23 eletriptan       01/10/24  topiramate  Last office visit with prescribing clinician: 1/10/2024      Next office visit with prescribing clinician: 1/13/2025     Lani Fong MA  10/17/24, 12:03 EDT  Sent both Rx to patients pharmacy with 2 refills.

## 2025-01-02 NOTE — TELEPHONE ENCOUNTER
MEDICATION REFILL REQUEST    Caller: BRITTANY GAMEZ    Relationship: Emergency Contact; [employer]    Best call back number: [phone]    Requested Prescriptions:   Requested Prescriptions     Pending Prescriptions Disp Refills    eletriptan (RELPAX) 20 MG tablet 8 tablet 2     Sig: Take 1 tablet by mouth As Needed for Migraine (Do not take over 2 tabs a day or 8 tabs a month).      Pharmacy where request should be sent: [facility] #[id number] - [address] AT [facility] [address] - [phone]  - [phone] FX     Last office visit with prescribing clinician: 1/10/2024   Last telemedicine visit with prescribing clinician: Visit date not found   Next office visit with prescribing clinician: 1/13/2025     Additional details provided by patient: PT IS COMPLETELY OUT OF THE RELPAX MEDICATION; TOOK HIS LAST TABLET LAST NIGHT DUE TO HAVING A MIGRAINE. HE WOKE THIS MORNING AND THE MIGRAINE HAS NOT SUBSIDED.    Does the patient have less than a 3 day supply?:  [x] Yes  [] No    Would you like a call back once the refill request has been completed?: [] Yes  [x] No    If the office needs to give you a call back, can they leave a voicemail?: [] Yes  [x] No    PLEASE REVIEW AND ADVISE.    Lisa Quintanilla Rep   01/02/25 08:32 EST

## 2025-01-03 RX ORDER — ELETRIPTAN HYDROBROMIDE 20 MG/1
20 TABLET, FILM COATED ORAL AS NEEDED
Qty: 8 TABLET | Refills: 0 | Status: SHIPPED | OUTPATIENT
Start: 2025-01-03 | End: 2026-01-03

## 2025-01-03 NOTE — TELEPHONE ENCOUNTER
Rx Refill Note  Requested Prescriptions     Pending Prescriptions Disp Refills    eletriptan (RELPAX) 20 MG tablet 8 tablet 2     Sig: Take 1 tablet by mouth As Needed for Migraine (Do not take over 2 tabs a day or 8 tabs a month).      Last filled:10/17/24 2 refill  Last office visit with prescribing clinician: 1/10/2024      Next office visit with prescribing clinician: 1/13/2025     CHAYA BHATIA  01/03/25, 08:54 EST      Sent to pharmacy with 0 refill-will need to schedule follow up for additional refills.

## 2025-01-13 ENCOUNTER — OFFICE VISIT (OUTPATIENT)
Dept: NEUROLOGY | Facility: CLINIC | Age: 60
End: 2025-01-13
Payer: COMMERCIAL

## 2025-01-13 VITALS
WEIGHT: 245 LBS | HEART RATE: 92 BPM | DIASTOLIC BLOOD PRESSURE: 98 MMHG | SYSTOLIC BLOOD PRESSURE: 160 MMHG | OXYGEN SATURATION: 97 % | BODY MASS INDEX: 34.3 KG/M2 | HEIGHT: 71 IN

## 2025-01-13 DIAGNOSIS — G43.719 INTRACTABLE CHRONIC MIGRAINE WITHOUT AURA AND WITHOUT STATUS MIGRAINOSUS: Primary | ICD-10-CM

## 2025-01-13 DIAGNOSIS — M54.12 CERVICAL RADICULOPATHY: ICD-10-CM

## 2025-01-13 PROCEDURE — 99213 OFFICE O/P EST LOW 20 MIN: CPT | Performed by: PSYCHIATRY & NEUROLOGY

## 2025-01-13 RX ORDER — ELETRIPTAN HYDROBROMIDE 20 MG/1
20 TABLET, FILM COATED ORAL AS NEEDED
Qty: 8 TABLET | Refills: 5 | Status: SHIPPED | OUTPATIENT
Start: 2025-01-13 | End: 2026-01-13

## 2025-01-13 RX ORDER — TOPIRAMATE 50 MG/1
50 TABLET, FILM COATED ORAL 2 TIMES DAILY
Qty: 60 TABLET | Refills: 11 | Status: SHIPPED | OUTPATIENT
Start: 2025-01-13

## 2025-01-13 NOTE — PROGRESS NOTES
Subjective:    CC: Jos Miller is seen today  for headaches, paresthesias    Current visit-he states that he stopped taking gabapentin about 7 months ago as his neck pain radiating down his arm was better.  His headaches were well-controlled up until 3 months ago when he started to have about 2-3 headaches a month for which he takes Relpax.  He thinks these could be due to weather changes.  Continues to be compliant with Topamax 50 mg twice daily.  Denies having any kidney stones.  He is sleeping well at night.    Last visit-his headaches remain well-controlled and he has had only 3 episodes since he last saw me in July 1 that lasted for about 2 days.  Continues to take Topamax 50 mg twice daily and Relpax as needed.  At his last visit he was having flank pain but fortunately did not have any kidney stones.  He also continues to have neck pain as well as tingling and numbness in both hands worse on the right more so while driving.  His last EMG/NCS done in December 2022 showed chronic right C5-6 radiculopathy as well as mild carpal tunnel on the right with normal examination on the left.  He does wear a wrist brace on the right at night.  He also complains of dizziness when he tilts his head back like reaching for something overhead or bending over.    Last visit- patient states that his headaches remain extremely well controlled as he has only had about 3 headaches in the past 5 months for which he took Relpax.  He continues to take Topamax 50 mg twice daily without any side effects however recently has been having some flank pain.  Thinks he may have a kidney stone for which she will talk to his PCP.  His neck pain radiating down into his arms has improved.  He has also lost 14 pounds since his last visit as he has been walking up to 2 miles a day.  He does get some pain in his thumb that radiates up into his forearms and upper arms while walking.  Does wear wrist braces at night.  With regards to his sleep  apnea he has not had a sleep test.  Will be going on his wife's insurance in August.  Will think about it then.    Last visit -he started taking Topamax now at 50 mg twice a day that has helped tremendously with the headaches.  He only had about 2-3 headaches in the past month for which she took Relpax 20 mg.  He still continues to have significant paresthesias in both arms and occasionally his legs/feet with numbness of his thumbs bilaterally as well as pain in his left deltoid muscle.  He had an EMG/NCS that showed borderline right median neuropathy in addition to chronic C5-6 radiculopathy with no findings in the left upper extremity.  With regards to his sleep apnea he is yet to schedule a sleep study.  He does wake up occasionally gasping for air.    Initial visit-57 year old male with a h/o chronic neck pain s/p cervical fusion surgery at C5-6-7, HTN, hearing loss in right ear s/p cochlear implant p/w headaches. As per patient he has had headaches for several years that start of in the back of his neck/head mainly on the left radiating to the front to behind his eye with occasional nausea, photophobia and phonophobia.  The headaches reach 8/10 in severity.  He has tried over-the-counter medications such as NSAIDs that do not help much.  He has also tried his wife's Relpax that helps within an hour.  He has also had problems with chronic neck pain with tingling and numbness in his arms/hands that has been ongoing for a few years.  He had neck fusion surgery at C5-6-7 in May 2018 that helped relieve his symptoms for some time but they have returned now.  He saw neurosurgery last year and they ordered a MRI of the cervical spine for him that showed multilevel disc bulges/degenerative disc disease with right more than left neural foraminal stenosis at C3-4 and C4-5 as well as post fusion changes in the lower cervical levels.  After that he saw pain management and they gave him epidural nerve blocks that did not  help.  He also had a trial for a spinal cord stimulator that helped tremendously with his headaches/some of his neck pain but not with the paresthesias in his hands.  He recently saw Dr. Alvarez again and he felt that a spinal cord stimulator will not help him long-term.  He has ordered an EMG/NCS for him to rule out compression neuropathies.  He also has hearing loss in his right ear that occurred after his previous neck surgery for which he has a cochlear implant.  Denies any tinnitus/hearing changes with his headaches that occur about 3-5 times a week.  He also reports to snoring at night and does have daytime somnolence/fatigue.  Currently takes gabapentin 300 mg 3 times a day for neck pain that was started last year as well as tizanidine as needed that was started recently.  Of note- I personally reviewed his MRI cervical spine, Dr. Alvarez's note and Dr. Ritter's note    The following portions of the patient's history were reviewed today and updated as of 11/17/2022  : allergies, current medications, past family history, past medical history, past surgical history and problem list  These document will be scanned to patient's chart.      Current Outpatient Medications:     Cholecalciferol (vitamin D3) 125 MCG (5000 UT) tablet tablet, Take 125 mcg by mouth Daily., Disp: , Rfl:     eletriptan (RELPAX) 20 MG tablet, Take 1 tablet by mouth As Needed for Migraine (Do not take over 2 tabs a day or 8 tabs a month)., Disp: 8 tablet, Rfl: 5    lisinopril (PRINIVIL,ZESTRIL) 20 MG tablet, Take 1 tablet by mouth Daily., Disp: , Rfl:     tiZANidine (ZANAFLEX) 4 MG tablet, TAKE 1 TABLET BY MOUTH THREE TIMES DAILY AS NEEDED FOR PAIN OR SPASM, Disp: , Rfl:     topiramate (Topamax) 50 MG tablet, Take 1 tablet by mouth 2 (Two) Times a Day., Disp: 60 tablet, Rfl: 11   Past Medical History:   Diagnosis Date    Back problem     Cervical disc disorder     Surgery 2016    Cochlear implant in place     Headache     HL (hearing loss)   "   Right ear only    Hypertension     Migraine     Wake up with them frequently    Narcotic habituation, continuous 2018    Neck pain     Spinal stenosis       Past Surgical History:   Procedure Laterality Date    APPENDECTOMY      AXILLARY LYMPH NODE BIOPSY/EXCISION      CERVICAL LYMPH NODE BIOPSY/EXCISION      COCHLEAR IMPLANT REVISION  2017    EPIDURAL BLOCK      INTERVENTIONAL RADIOLOGY PROCEDURE N/A 2022    Procedure: Cervical myelogram;  Surgeon: Chandrakant Monge MD;  Location: [facility] INVASIVE LOCATION;  Service: Interventional Radiology;  Laterality: N/A;    KNEE SURGERY      NECK SURGERY      C5-7 ACDF- Dr. Nikhil Barker.      Family History   Problem Relation Age of Onset    Other Mother         Polio, whooping cough    Arthritis Mother     Atrial fibrillation Mother     Asthma Mother     Alzheimer's disease Father     Liver cancer Maternal Grandmother       Social History     Socioeconomic History    Marital status:    Tobacco Use    Smoking status: Former     Current packs/day: 0.00     Average packs/day: 1 pack/day for 12.0 years (12.0 ttl pk-yrs)     Types: Cigarettes     Start date: 1996     Quit date: 2008     Years since quittin.0     Passive exposure: Past    Smokeless tobacco: Never   Vaping Use    Vaping status: Never Used   Substance and Sexual Activity    Alcohol use: Not Currently    Drug use: Never    Sexual activity: Yes     Partners: Female     Birth control/protection: None     Review of Systems   Musculoskeletal:  Positive for neck pain and neck stiffness.   Neurological:  Positive for headache.   All other systems reviewed and are negative.      Objective:    /98   Pulse 92   Ht 179.1 cm (70.5\")   Wt 111 kg (245 lb)   SpO2 97%   BMI 34.66 kg/m²     Neurology Exam:    General apperance: Obese    Mental status: Alert, awake and oriented to time place and person.    Recent and Remote memory: Intact.    Attention span " and Concentration: Normal.     Language and Speech: Intact- No dysarthria.    Fluency, Naming , Repitition and Comprehension:  Intact    Cranial Nerves:   CN II: Visual fields are full. Intact. Fundi - Normal, No papillederma, Pupils - AVANI  CN III, IV and VI: Extraocular movements are intact. Normal saccades.   CN V: Facial sensation is intact.   CN VII: Muscles of facial expression reveal no asymmetry. Intact.   CN VIII: Hearing is intact. Whispered voice intact.   CN IX and X: Palate elevates symmetrically. Intact  CN XI: Shoulder shrug is intact.   CN XII: Tongue is midline without evidence of atrophy or fasciculation.     Ophthalmoscopic exam of optic disc-deferred at    Motor:  Right UE muscle strength 5/5. Normal tone.     Left UE muscle strength 5/5. Normal tone.      Right LE muscle strength5/5. Normal tone.     Left LE muscle strength 5/5. Normal tone.      Sensory: Normal light touch, vibration and pinprick sensation bilaterally.    DTRs: 2+ bilaterally in upper and lower extremities.    Babinski: Negative bilaterally.    Co-ordination: Normal finger-to-nose, heel to shin B/L.    Rhomberg: Negative.    Gait: Normal.    Cardiovascular: Regular rate and rhythm without murmur, gallop or rub.    Assessment and Plan:  1. Intractable chronic migraine without aura and without status migrainosus  I discussed increasing his dose of Topamax slightly but he is reluctant therefore I have asked him to continue Topamax 50 mg twice daily.    For abortive treatment of his headaches he can continue Relpax 20 mg as needed  He does keep himself well-hydrated  I have also told him to start taking magnesium glycinate supplements    2.  Cervical radiculopathy  EMG/NCS showed borderline right carpal tunnel as well as chronic C5-6 radiculopathy on the right  MRI cervical spine did show multilevel disc bulges/degenerative disc changes with right more than left neural foraminal stenosis at C3-4-5   Has stopped gabapentin  now    3.  Sleep apnea  Did not have a sleep study but has lost weight since he tries to walk daily  He is sleeping well at night       Return in about 1 year (around 1/13/2026).       Nikki Salter MD

## 2025-01-17 ENCOUNTER — PATIENT ROUNDING (BHMG ONLY) (OUTPATIENT)
Dept: NEUROLOGY | Facility: CLINIC | Age: 60
End: 2025-01-17
Payer: COMMERCIAL

## 2025-01-20 RX ORDER — TOPIRAMATE 50 MG/1
50 TABLET, FILM COATED ORAL 2 TIMES DAILY
Qty: 60 TABLET | Refills: 11 | OUTPATIENT
Start: 2025-01-20

## (undated) DEVICE — TRY L/P SFTY A/20G

## (undated) DEVICE — NDL SPINE 22G 31/2IN BLK